# Patient Record
Sex: FEMALE | Race: BLACK OR AFRICAN AMERICAN | NOT HISPANIC OR LATINO | Employment: FULL TIME | URBAN - METROPOLITAN AREA
[De-identification: names, ages, dates, MRNs, and addresses within clinical notes are randomized per-mention and may not be internally consistent; named-entity substitution may affect disease eponyms.]

---

## 2022-05-19 ENCOUNTER — TELEPHONE (OUTPATIENT)
Dept: OBSTETRICS AND GYNECOLOGY | Facility: CLINIC | Age: 37
End: 2022-05-19
Payer: MEDICAID

## 2022-05-19 NOTE — TELEPHONE ENCOUNTER
----- Message from Lisa Murrieta sent at 5/19/2022 12:35 PM CDT -----  Regarding: appt request  Contact: pt  Pt requesting an appt for excessive bleeding. [phone]

## 2023-10-30 ENCOUNTER — HOSPITAL ENCOUNTER (EMERGENCY)
Facility: HOSPITAL | Age: 38
Discharge: HOME | End: 2023-10-30
Attending: EMERGENCY MEDICINE
Payer: MEDICAID

## 2023-10-30 VITALS
HEART RATE: 101 BPM | TEMPERATURE: 98 F | SYSTOLIC BLOOD PRESSURE: 148 MMHG | WEIGHT: 221.81 LBS | HEIGHT: 65 IN | BODY MASS INDEX: 36.96 KG/M2 | RESPIRATION RATE: 16 BRPM | DIASTOLIC BLOOD PRESSURE: 95 MMHG | OXYGEN SATURATION: 98 %

## 2023-10-30 DIAGNOSIS — R51.9 FACIAL PAIN: Primary | ICD-10-CM
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
## 2023-10-30 DIAGNOSIS — S00.83XA CONTUSION OF FACE, INITIAL ENCOUNTER: ICD-10-CM

## 2023-10-30 PROCEDURE — 99284 EMERGENCY DEPT VISIT MOD MDM: CPT | Mod: 25

## 2023-10-30 RX ORDER — DICLOFENAC SODIUM 50 MG/1
50 TABLET, DELAYED RELEASE ORAL 3 TIMES DAILY PRN
Qty: 15 TABLET | Refills: 0 | Status: SHIPPED | OUTPATIENT
Start: 2023-10-30 | End: 2024-02-27
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
## 2025-06-02 PROCEDURE — 96376 TX/PRO/DX INJ SAME DRUG ADON: CPT

## 2025-06-02 PROCEDURE — 96365 THER/PROPH/DIAG IV INF INIT: CPT

## 2025-06-02 PROCEDURE — 96375 TX/PRO/DX INJ NEW DRUG ADDON: CPT

## 2025-06-02 PROCEDURE — 99285 EMERGENCY DEPT VISIT HI MDM: CPT

## 2025-06-03 ENCOUNTER — HOSPITAL ENCOUNTER (INPATIENT)
Facility: HOSPITAL | Age: 40
LOS: 3 days | Discharge: HOME | DRG: 419 | End: 2025-06-06
Attending: EMERGENCY MEDICINE | Admitting: FAMILY MEDICINE
Payer: MEDICAID

## 2025-06-03 DIAGNOSIS — F17.200 TOBACCO DEPENDENCY: ICD-10-CM

## 2025-06-03 DIAGNOSIS — R07.9 CHEST PAIN: ICD-10-CM

## 2025-06-03 DIAGNOSIS — K81.9 CHOLECYSTITIS: Primary | ICD-10-CM

## 2025-06-03 DIAGNOSIS — K52.9 COLITIS: ICD-10-CM

## 2025-06-03 PROBLEM — I72.2 RENAL ARTERY ANEURYSM: Status: ACTIVE | Noted: 2025-06-03

## 2025-06-03 PROBLEM — R10.11 RIGHT UPPER QUADRANT ABDOMINAL PAIN: Status: ACTIVE | Noted: 2025-06-03

## 2025-06-03 PROBLEM — K80.20 CHOLELITHIASIS: Status: ACTIVE | Noted: 2025-06-03

## 2025-06-03 PROBLEM — R10.10 PAIN OF UPPER ABDOMEN: Status: ACTIVE | Noted: 2025-06-03

## 2025-06-03 PROBLEM — I10 ESSENTIAL HYPERTENSION: Status: ACTIVE | Noted: 2025-06-03

## 2025-06-03 LAB
ABSOLUTE EOSINOPHIL (OHS): 0.47 K/UL
ABSOLUTE MONOCYTE (OHS): 0.94 K/UL (ref 0.3–1)
ABSOLUTE NEUTROPHIL COUNT (OHS): 10.78 K/UL (ref 1.8–7.7)
ALBUMIN SERPL BCP-MCNC: 3.6 G/DL (ref 3.5–5.2)
ALP SERPL-CCNC: 97 UNIT/L (ref 40–150)
ALT SERPL W/O P-5'-P-CCNC: 23 UNIT/L (ref 10–44)
ANION GAP (OHS): 14 MMOL/L (ref 8–16)
AST SERPL-CCNC: 26 UNIT/L (ref 11–45)
BACTERIA #/AREA URNS AUTO: NORMAL /HPF
BASOPHILS # BLD AUTO: 0.04 K/UL
BASOPHILS NFR BLD AUTO: 0.3 %
BILIRUB SERPL-MCNC: 0.4 MG/DL (ref 0.1–1)
BILIRUB UR QL STRIP.AUTO: NEGATIVE
BUN SERPL-MCNC: 8 MG/DL (ref 6–20)
CALCIUM SERPL-MCNC: 8.8 MG/DL (ref 8.7–10.5)
CHLORIDE SERPL-SCNC: 104 MMOL/L (ref 95–110)
CLARITY UR: CLEAR
CO2 SERPL-SCNC: 17 MMOL/L (ref 23–29)
COLOR UR AUTO: YELLOW
CREAT SERPL-MCNC: 0.7 MG/DL (ref 0.5–1.4)
ERYTHROCYTE [DISTWIDTH] IN BLOOD BY AUTOMATED COUNT: 12.9 % (ref 11.5–14.5)
GFR SERPLBLD CREATININE-BSD FMLA CKD-EPI: >60 ML/MIN/1.73/M2
GLUCOSE SERPL-MCNC: 158 MG/DL (ref 70–110)
GLUCOSE UR QL STRIP: NEGATIVE
HCT VFR BLD AUTO: 38.1 % (ref 37–48.5)
HCV AB SERPL QL IA: NEGATIVE
HGB BLD-MCNC: 12.6 GM/DL (ref 12–16)
HGB UR QL STRIP: ABNORMAL
HIV 1+2 AB+HIV1 P24 AG SERPL QL IA: NEGATIVE
HOLD SPECIMEN: NORMAL
HOLD SPECIMEN: NORMAL
IMM GRANULOCYTES # BLD AUTO: 0.06 K/UL (ref 0–0.04)
IMM GRANULOCYTES NFR BLD AUTO: 0.4 % (ref 0–0.5)
INFLUENZA A MOLECULAR (OHS): NEGATIVE
INFLUENZA B MOLECULAR (OHS): NEGATIVE
KETONES UR QL STRIP: ABNORMAL
LEUKOCYTE ESTERASE UR QL STRIP: NEGATIVE
LIPASE SERPL-CCNC: 18 U/L (ref 4–60)
LYMPHOCYTES # BLD AUTO: 1.36 K/UL (ref 1–4.8)
MCH RBC QN AUTO: 30.1 PG (ref 27–31)
MCHC RBC AUTO-ENTMCNC: 33.1 G/DL (ref 32–36)
MCV RBC AUTO: 91 FL (ref 82–98)
MICROSCOPIC COMMENT: NORMAL
NITRITE UR QL STRIP: NEGATIVE
NUCLEATED RBC (/100WBC) (OHS): 0 /100 WBC
PH UR STRIP: 8 [PH]
PLATELET # BLD AUTO: 363 K/UL (ref 150–450)
PMV BLD AUTO: 9.7 FL (ref 9.2–12.9)
POTASSIUM SERPL-SCNC: 3.4 MMOL/L (ref 3.5–5.1)
PROT SERPL-MCNC: 8.2 GM/DL (ref 6–8.4)
PROT UR QL STRIP: NEGATIVE
RBC # BLD AUTO: 4.18 M/UL (ref 4–5.4)
RBC #/AREA URNS AUTO: 3 /HPF (ref 0–4)
RELATIVE EOSINOPHIL (OHS): 3.4 %
RELATIVE LYMPHOCYTE (OHS): 10 % (ref 18–48)
RELATIVE MONOCYTE (OHS): 6.9 % (ref 4–15)
RELATIVE NEUTROPHIL (OHS): 79 % (ref 38–73)
SARS-COV-2 RDRP RESP QL NAA+PROBE: NEGATIVE
SODIUM SERPL-SCNC: 135 MMOL/L (ref 136–145)
SP GR UR STRIP: >=1.03
SQUAMOUS #/AREA URNS AUTO: 1 /HPF
UROBILINOGEN UR STRIP-ACNC: NEGATIVE EU/DL
WBC # BLD AUTO: 13.65 K/UL (ref 3.9–12.7)
WBC #/AREA URNS AUTO: 1 /HPF (ref 0–5)

## 2025-06-03 PROCEDURE — 25000003 PHARM REV CODE 250: Performed by: NURSE PRACTITIONER

## 2025-06-03 PROCEDURE — G0378 HOSPITAL OBSERVATION PER HR: HCPCS

## 2025-06-03 PROCEDURE — 63600175 PHARM REV CODE 636 W HCPCS: Performed by: NURSE PRACTITIONER

## 2025-06-03 PROCEDURE — 80053 COMPREHEN METABOLIC PANEL: CPT | Performed by: NURSE PRACTITIONER

## 2025-06-03 PROCEDURE — U0002 COVID-19 LAB TEST NON-CDC: HCPCS | Performed by: NURSE PRACTITIONER

## 2025-06-03 PROCEDURE — 99900035 HC TECH TIME PER 15 MIN (STAT)

## 2025-06-03 PROCEDURE — 25000003 PHARM REV CODE 250: Performed by: EMERGENCY MEDICINE

## 2025-06-03 PROCEDURE — 83690 ASSAY OF LIPASE: CPT | Performed by: NURSE PRACTITIONER

## 2025-06-03 PROCEDURE — 87502 INFLUENZA DNA AMP PROBE: CPT | Performed by: NURSE PRACTITIONER

## 2025-06-03 PROCEDURE — 63600175 PHARM REV CODE 636 W HCPCS: Performed by: EMERGENCY MEDICINE

## 2025-06-03 PROCEDURE — 11000001 HC ACUTE MED/SURG PRIVATE ROOM

## 2025-06-03 PROCEDURE — 99204 OFFICE O/P NEW MOD 45 MIN: CPT | Mod: ,,, | Performed by: SURGERY

## 2025-06-03 PROCEDURE — 63600175 PHARM REV CODE 636 W HCPCS: Performed by: COLON & RECTAL SURGERY

## 2025-06-03 PROCEDURE — 86803 HEPATITIS C AB TEST: CPT | Performed by: EMERGENCY MEDICINE

## 2025-06-03 PROCEDURE — 25000003 PHARM REV CODE 250: Performed by: COLON & RECTAL SURGERY

## 2025-06-03 PROCEDURE — 25500020 PHARM REV CODE 255: Performed by: EMERGENCY MEDICINE

## 2025-06-03 PROCEDURE — 63600175 PHARM REV CODE 636 W HCPCS: Performed by: STUDENT IN AN ORGANIZED HEALTH CARE EDUCATION/TRAINING PROGRAM

## 2025-06-03 PROCEDURE — 85025 COMPLETE CBC W/AUTO DIFF WBC: CPT | Performed by: NURSE PRACTITIONER

## 2025-06-03 PROCEDURE — 99204 OFFICE O/P NEW MOD 45 MIN: CPT | Mod: ,,, | Performed by: STUDENT IN AN ORGANIZED HEALTH CARE EDUCATION/TRAINING PROGRAM

## 2025-06-03 PROCEDURE — 81001 URINALYSIS AUTO W/SCOPE: CPT | Performed by: NURSE PRACTITIONER

## 2025-06-03 PROCEDURE — 94761 N-INVAS EAR/PLS OXIMETRY MLT: CPT

## 2025-06-03 PROCEDURE — 87389 HIV-1 AG W/HIV-1&-2 AB AG IA: CPT | Performed by: EMERGENCY MEDICINE

## 2025-06-03 PROCEDURE — 94799 UNLISTED PULMONARY SVC/PX: CPT

## 2025-06-03 RX ORDER — SODIUM CHLORIDE 9 MG/ML
INJECTION, SOLUTION INTRAVENOUS CONTINUOUS
Status: DISCONTINUED | OUTPATIENT
Start: 2025-06-03 | End: 2025-06-06

## 2025-06-03 RX ORDER — MORPHINE SULFATE 4 MG/ML
4 INJECTION, SOLUTION INTRAMUSCULAR; INTRAVENOUS
Refills: 0 | Status: COMPLETED | OUTPATIENT
Start: 2025-06-03 | End: 2025-06-03

## 2025-06-03 RX ORDER — HYDROMORPHONE HYDROCHLORIDE 1 MG/ML
0.5 INJECTION, SOLUTION INTRAMUSCULAR; INTRAVENOUS; SUBCUTANEOUS EVERY 4 HOURS PRN
Refills: 0 | Status: DISCONTINUED | OUTPATIENT
Start: 2025-06-03 | End: 2025-06-05

## 2025-06-03 RX ORDER — HYDRALAZINE HYDROCHLORIDE 20 MG/ML
10 INJECTION INTRAMUSCULAR; INTRAVENOUS EVERY 6 HOURS PRN
Status: DISCONTINUED | OUTPATIENT
Start: 2025-06-03 | End: 2025-06-06 | Stop reason: HOSPADM

## 2025-06-03 RX ORDER — ONDANSETRON HYDROCHLORIDE 2 MG/ML
4 INJECTION, SOLUTION INTRAVENOUS
Status: COMPLETED | OUTPATIENT
Start: 2025-06-03 | End: 2025-06-03

## 2025-06-03 RX ORDER — IBUPROFEN 200 MG
24 TABLET ORAL
Status: DISCONTINUED | OUTPATIENT
Start: 2025-06-03 | End: 2025-06-06 | Stop reason: HOSPADM

## 2025-06-03 RX ORDER — HYDROMORPHONE HYDROCHLORIDE 1 MG/ML
1 INJECTION, SOLUTION INTRAMUSCULAR; INTRAVENOUS; SUBCUTANEOUS ONCE
Status: DISCONTINUED | OUTPATIENT
Start: 2025-06-03 | End: 2025-06-03

## 2025-06-03 RX ORDER — SODIUM CHLORIDE 0.9 % (FLUSH) 0.9 %
3 SYRINGE (ML) INJECTION EVERY 8 HOURS PRN
Status: DISCONTINUED | OUTPATIENT
Start: 2025-06-03 | End: 2025-06-06 | Stop reason: HOSPADM

## 2025-06-03 RX ORDER — ACETAMINOPHEN 325 MG/1
650 TABLET ORAL EVERY 4 HOURS PRN
Status: DISCONTINUED | OUTPATIENT
Start: 2025-06-03 | End: 2025-06-06 | Stop reason: HOSPADM

## 2025-06-03 RX ORDER — IBUPROFEN 200 MG
16 TABLET ORAL
Status: DISCONTINUED | OUTPATIENT
Start: 2025-06-03 | End: 2025-06-06 | Stop reason: HOSPADM

## 2025-06-03 RX ORDER — HYDROMORPHONE HYDROCHLORIDE 1 MG/ML
1 INJECTION, SOLUTION INTRAMUSCULAR; INTRAVENOUS; SUBCUTANEOUS
Refills: 0 | Status: COMPLETED | OUTPATIENT
Start: 2025-06-03 | End: 2025-06-03

## 2025-06-03 RX ORDER — NALOXONE HCL 0.4 MG/ML
0.02 VIAL (ML) INJECTION
Status: DISCONTINUED | OUTPATIENT
Start: 2025-06-03 | End: 2025-06-06 | Stop reason: HOSPADM

## 2025-06-03 RX ORDER — ONDANSETRON HYDROCHLORIDE 2 MG/ML
4 INJECTION, SOLUTION INTRAVENOUS EVERY 6 HOURS PRN
Status: DISCONTINUED | OUTPATIENT
Start: 2025-06-03 | End: 2025-06-06 | Stop reason: HOSPADM

## 2025-06-03 RX ORDER — TALC
6 POWDER (GRAM) TOPICAL NIGHTLY PRN
Status: DISCONTINUED | OUTPATIENT
Start: 2025-06-03 | End: 2025-06-06 | Stop reason: HOSPADM

## 2025-06-03 RX ORDER — GLUCAGON 1 MG
1 KIT INJECTION
Status: DISCONTINUED | OUTPATIENT
Start: 2025-06-03 | End: 2025-06-06 | Stop reason: HOSPADM

## 2025-06-03 RX ORDER — BISACODYL 10 MG/1
10 SUPPOSITORY RECTAL DAILY PRN
Status: DISCONTINUED | OUTPATIENT
Start: 2025-06-03 | End: 2025-06-06 | Stop reason: HOSPADM

## 2025-06-03 RX ADMIN — ONDANSETRON 4 MG: 2 INJECTION INTRAMUSCULAR; INTRAVENOUS at 01:06

## 2025-06-03 RX ADMIN — MORPHINE SULFATE 4 MG: 4 INJECTION INTRAVENOUS at 02:06

## 2025-06-03 RX ADMIN — SODIUM CHLORIDE: 9 INJECTION, SOLUTION INTRAVENOUS at 05:06

## 2025-06-03 RX ADMIN — IOHEXOL 100 ML: 350 INJECTION, SOLUTION INTRAVENOUS at 02:06

## 2025-06-03 RX ADMIN — SODIUM CHLORIDE: 9 INJECTION, SOLUTION INTRAVENOUS at 08:06

## 2025-06-03 RX ADMIN — MORPHINE SULFATE 4 MG: 4 INJECTION INTRAVENOUS at 01:06

## 2025-06-03 RX ADMIN — PIPERACILLIN AND TAZOBACTAM 4.5 G: 4; .5 INJECTION, POWDER, LYOPHILIZED, FOR SOLUTION INTRAVENOUS; PARENTERAL at 08:06

## 2025-06-03 RX ADMIN — ACETAMINOPHEN 650 MG: 325 TABLET ORAL at 03:06

## 2025-06-03 RX ADMIN — PIPERACILLIN AND TAZOBACTAM 4.5 G: 4; .5 INJECTION, POWDER, LYOPHILIZED, FOR SOLUTION INTRAVENOUS; PARENTERAL at 02:06

## 2025-06-03 RX ADMIN — ONDANSETRON 4 MG: 2 INJECTION INTRAMUSCULAR; INTRAVENOUS at 09:06

## 2025-06-03 RX ADMIN — PROMETHAZINE HYDROCHLORIDE 12.5 MG: 25 INJECTION INTRAMUSCULAR; INTRAVENOUS at 02:06

## 2025-06-03 RX ADMIN — HYDROMORPHONE HYDROCHLORIDE 0.5 MG: 1 INJECTION, SOLUTION INTRAMUSCULAR; INTRAVENOUS; SUBCUTANEOUS at 11:06

## 2025-06-03 RX ADMIN — HYDROMORPHONE HYDROCHLORIDE 1 MG: 1 INJECTION, SOLUTION INTRAMUSCULAR; INTRAVENOUS; SUBCUTANEOUS at 04:06

## 2025-06-03 RX ADMIN — PIPERACILLIN AND TAZOBACTAM 4.5 G: 4; .5 INJECTION, POWDER, LYOPHILIZED, FOR SOLUTION INTRAVENOUS at 04:06

## 2025-06-03 RX ADMIN — HYDROMORPHONE HYDROCHLORIDE 0.5 MG: 1 INJECTION, SOLUTION INTRAMUSCULAR; INTRAVENOUS; SUBCUTANEOUS at 09:06

## 2025-06-03 NOTE — PLAN OF CARE
Discussed poc with pt, pt verbalized understanding    Purposeful rounding every 2hours    VS wnl   Fall precautions in place, remains injury free  Pt denies c/o any nausea or vomiting at this time and patient will continue to be monitored.  Pain and nausea under control with PRN meds    IVFs  Accurate I&Os  Abx given as prescribed  Bed locked at lowest position  Call light within reach    Chart check complete  Will cont with POC

## 2025-06-03 NOTE — ASSESSMENT & PLAN NOTE
Patient's blood pressure range in the last 24 hours was: BP  Min: 136/78  Max: 195/95.The patient's inpatient anti-hypertensive regimen is listed below:  Current Antihypertensives  hydrALAZINE injection 10 mg, Every 6 hours PRN, Intravenous    Plan  - BP is controlled, no changes needed to their regimen  - Pain control helped with BP, holding home HCTZ for now

## 2025-06-03 NOTE — SUBJECTIVE & OBJECTIVE
Past Medical History:   Diagnosis Date    Abnormal Pap smear of cervix     Essential (primary) hypertension     Pulmonary embolism        Past Surgical History:   Procedure Laterality Date     SECTION      DIAGNOSTIC LAPAROSCOPY  2023    DILATION AND CURETTAGE OF UTERUS         Review of patient's allergies indicates:  No Known Allergies    No current facility-administered medications on file prior to encounter.     Current Outpatient Medications on File Prior to Encounter   Medication Sig    hydroCHLOROthiazide (MICROZIDE) 12.5 mg capsule hydrochlorothiazide Take No date recorded No form recorded No frequency recorded No route recorded No set duration recorded No set duration amount recorded active No dosage strength recorded No dosage strength units of measure recorded    ibuprofen (ADVIL,MOTRIN) 600 MG tablet Take 1 tablet (600 mg total) by mouth 3 (three) times daily.    oxyCODONE-acetaminophen (PERCOCET) 5-325 mg per tablet Take 1 tablet by mouth every 4 (four) hours as needed for Pain. for pain.     Family History    None       Tobacco Use    Smoking status: Some Days     Types: Cigarettes    Smokeless tobacco: Never   Substance and Sexual Activity    Alcohol use: Not on file    Drug use: Not on file    Sexual activity: Not on file     Review of Systems   Constitutional: Negative.  Negative for chills and fever.   HENT: Negative.     Eyes: Negative.    Respiratory:  Positive for chest tightness. Negative for cough, shortness of breath and wheezing.    Cardiovascular: Negative.  Negative for chest pain, palpitations and leg swelling.   Gastrointestinal:  Positive for abdominal pain, constipation, nausea and vomiting.   Endocrine: Negative.    Genitourinary: Negative.    Musculoskeletal: Negative.    Skin: Negative.    Allergic/Immunologic: Negative.    Neurological: Negative.    Psychiatric/Behavioral: Negative.     All other systems reviewed and are negative.    Objective:     Vital Signs  (Most Recent):  Temp: 98.5 °F (36.9 °C) (06/02/25 2127)  Pulse: 66 (06/03/25 0557)  Resp: 18 (06/03/25 0557)  BP: (!) 195/95 (06/03/25 0557)  SpO2: 100 % (06/03/25 0557) Vital Signs (24h Range):  Temp:  [98.5 °F (36.9 °C)] 98.5 °F (36.9 °C)  Pulse:  [66-88] 66  Resp:  [16-19] 18  SpO2:  [98 %-100 %] 100 %  BP: (136-195)/(73-95) 195/95     Weight: 104.3 kg (230 lb)  Body mass index is 38.27 kg/m².     Physical Exam  Vitals reviewed.   Constitutional:       General: She is not in acute distress.     Appearance: She is ill-appearing.   HENT:      Head: Normocephalic and atraumatic.      Nose: Nose normal.      Mouth/Throat:      Mouth: Mucous membranes are moist.      Pharynx: Oropharynx is clear.   Eyes:      Extraocular Movements: Extraocular movements intact.      Pupils: Pupils are equal, round, and reactive to light.   Cardiovascular:      Rate and Rhythm: Normal rate and regular rhythm.      Pulses: Normal pulses.      Heart sounds: Normal heart sounds.   Pulmonary:      Effort: Pulmonary effort is normal. No respiratory distress.      Breath sounds: Normal breath sounds. No wheezing or rales.   Chest:      Chest wall: No tenderness.   Abdominal:      General: Bowel sounds are normal. There is no distension.      Palpations: Abdomen is soft.      Tenderness: There is abdominal tenderness. There is guarding.      Comments: Generalized abdominal tenderness with palpation, especially to epigastric area and RUQ, mild TTP to LLQ   Musculoskeletal:         General: Normal range of motion.      Cervical back: Neck supple.      Right lower leg: No edema.      Left lower leg: No edema.   Skin:     General: Skin is warm and dry.      Capillary Refill: Capillary refill takes less than 2 seconds.   Neurological:      General: No focal deficit present.      Mental Status: She is alert and oriented to person, place, and time.   Psychiatric:         Mood and Affect: Mood normal.         Behavior: Behavior normal.          Thought Content: Thought content normal.              CRANIAL NERVES     CN III, IV, VI   Pupils are equal, round, and reactive to light.       Significant Labs: All pertinent labs within the past 24 hours have been reviewed.  Recent Lab Results         06/03/25  0503   06/03/25  0137   06/03/25  0039        Influenza A, Molecular     Negative       Influenza B, Molecular     Negative       Albumin   3.6         ALP   97         ALT   23         Anion Gap   14         Appearance, UA Clear           AST   26         Bacteria, UA Rare           Baso #   0.04         Basophil %   0.3         Bilirubin (UA) Negative           BILIRUBIN TOTAL   0.4  Comment: For infants and newborns, interpretation of results should be based   on gestational age, weight and in agreement with clinical   observations.    Premature Infant recommended reference ranges:   0-24 hours:  <8.0 mg/dL   24-48 hours: <12.0 mg/dL   3-5 days:    <15.0 mg/dL   6-29 days:   <15.0 mg/dL         BUN   8         Calcium   8.8         Chloride   104         CO2   17         Color, UA Yellow           SARS COV-2 MOLECULAR     Negative  Comment: This test utilizes isothermal nucleic acid amplification technology to detect the SARS-CoV-2 RdRp nucleic acid segment. The analytical sensitivity (limit of detection) is 500 copies/swab.     A POSITIVE result is indicative of the presence of SARS-CoV-2 RNA; clinical correlation with patient history and other diagnostic information is necessary to determine patient infection status.    A NEGATIVE result means that SARS-CoV-2 nucleic acids are not present above the limit of detection. A NEGATIVE result should be treated as presumptive. It does not rule out the possibility of COVID-19 and should not be the sole basis for treatment decisions.    This test is Food and Drug Administration (FDA) approved.  Performance characteristics of this test has been independently verified by [facility] Department of  Pathology and Laboratory Medicine.       Creatinine   0.7         eGFR   >60  Comment: Estimated GFR calculated using the CKD-EPI creatinine (2021) equation.         Eos #   0.47         Eos %   3.4         Glucose   158         Glucose, UA Negative           Gran # (ANC)   10.78         Hematocrit   38.1         Hemoglobin   12.6         Hepatitis C Ab   Negative         HIV 1/2 Ag/Ab   Negative         Extra Tube Hold for add-ons.  Comment: Auto resulted.      Hold for add-ons.  Comment: Auto resulted.            Immature Grans (Abs)   0.06  Comment: Mild elevation in immature granulocytes is non specific and can be seen in a variety of conditions including stress response, acute inflammation, trauma and pregnancy. Correlation with other laboratory and clinical findings is essential.         Immature Granulocytes   0.4         Ketones, UA 2+           Leukocyte Esterase, UA Negative           Lipase   18         Lymph #   1.36         Lymph %   10.0         MCH   30.1         MCHC   33.1         MCV   91         Microscopic Comment   Comment: Other formed elements not mentioned in the report are not present in the microscopic examination.           Mono #   0.94         Mono %   6.9         MPV   9.7         Neut %   79.0         NITRITE UA Negative           nRBC   0         Blood, UA 1+           pH, UA 8.0           Platelet Count   363         Potassium   3.4         PROTEIN TOTAL   8.2         Protein, UA Negative  Comment: Recommend a 24 hour urine protein or a urine protein/creatinine ratio if globulin induced proteinuria is clinically suspected.           RBC   4.18         RBC, UA 3           RDW   12.9         Sodium   135         Spec Grav UA >=1.030           Squam Epithel, UA 1           Urobilinogen, UA Negative           WBC, UA 1           WBC   13.65                 Significant Imaging: I have reviewed all pertinent imaging results/findings within the past 24 hours.    [facility] Radiology Procedure  Done:  CT Abdomen and Pelvis With Intravenous Contrast  Interpretation:  Mild thickening of the walls of the left colon and sigmoid. Incomplete distention versus mild colitis. Cholelithiasis in a distended gallbladder. Common bile duct. Non obstructing renal stones. Thickened urinary bladder. Check UTI/cystitis. 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  Radiologist:  Daniel Ochoa MD  6/3/2025  02:38

## 2025-06-03 NOTE — HPI
Rhona Zaman is a 39 y.o. female as hypertension, and history of pulmonary embolism in 2020 (not on anticoagulation), and current smoking who presented to the ER last night with generalized abdominal pain as well as nausea, vomiting, constipation, and chest tightness.  She notes the pain is predominantly located in the left lower quadrant and up the left side of the abdomen, and states that she has been having this pain off and on for more than a year.  She notes that she had a hysterectomy about a year ago in attempts to deal with this pain however states the pain has only gotten worse.  In addition to her left lower quadrant pain which has improved with lying in the fetal position, she also does note some intermittent right upper quadrant and epigastric pain.  In addition she complains of nausea but without vomiting and decreased p.o. intake.  She is chronically constipated but did have some diarrhea last week.  She has never had a colonoscopy or an EGD.  In the ER she was afebrile, vitals were all within normal limits.  White count was mildly elevated at 13.65, the remainder of her labs were overall normal, including lipase and LFTs.  CT abdomen and pelvis was done with IV contrast which on initial report showed mild thickening of left colon and sigmoid concerning for colitis as well as stones and a distended gallbladder.  Subsequent right upper quadrant ultrasound today showed mild thickening of the wall of the gallbladder (up to 4mm) with several stones, but no pericholecystic fluid or sonographic Santiago sign.

## 2025-06-03 NOTE — ASSESSMENT & PLAN NOTE
Discussed with her that upper abdominal pain has a wide differential.  On imaging her colitis extensor in the left colon all the way to the looks like it is involving at least part of the transverse.  In addition she could have cholecystitis (although this seems little less likely), or could even have an ulcer.    -- recommend HIDA Scan   -- recommend GI eval for colitis or underlying IBD

## 2025-06-03 NOTE — ASSESSMENT & PLAN NOTE
Relatively confusing picture especially since the bulk of her pain that she has is located in the left lower quadrant which is not consistent with cholecystitis.  Is certainly possible that the nausea and the right upper quadrant pain and even some of the epigastric pain may be due to her gallbladder.  Due to this confusing picture I would recommend obtaining a HIDA scan.  I discussed with the patient and her mother extensively that if her HIDA scan is normal I would not recommend removing her gallbladder as I do not feel that that would improve her pain or her symptoms at this time.  If however the HIDA scan fails to show filling her gallbladder I certainly think it is reasonable to remove her gallbladder but with the understanding that it likely will not resolve her left lower quadrant pain.

## 2025-06-03 NOTE — ASSESSMENT & PLAN NOTE
The management as per primary team and vascular   I have personally evaluated and examined the patient. The Attending was available to me as a supervising provider if needed.

## 2025-06-03 NOTE — HPI
Patient is a 39-year-old female with past medical history significant for hypertension, pulmonary embolism in 2020, and tobacco abuse who presented  to ED with complaint of generalized abdominal pain. She also reports nausea, vomiting, constipation, and chest tightness. She denies vomiting, dysuria, fever, chills, back pain, shortness of breath, productive cough, weakness, edema. on arrival to ED, temp 98.5°, heart rate 88, respirations 17, blood pressure 140/82, 98% SpO2 on room air. lab workup shows WBC 13.65, hemoglobin 12.6, hematocrit 38.1, platelets 363, sodium 135, potassium 3.4, chloride 104, CO2 17, BUN 8, creatinine 0.7, glucose 158, alk-phos 97, AST 26, ALT 23, lipase 18, negative covid 19 screening, negative influenza A/B screening, rapid HIV negative, UA  done with no signs of infection.  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 While in ED, she was given IV Zosyn, dilaudid, morphine, zofran, and phenergan. General surgery was consulted per ED. Hospital Medicine was consulted for admission due to abdominal pain. General surgery recommended US gallbladder, which is ordered, pending.

## 2025-06-03 NOTE — ASSESSMENT & PLAN NOTE
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

## 2025-06-03 NOTE — CONSULTS
"    Consult    Consulting Physician:  Chrissie Lopez MD  Reason for Consultation: right renal artery aneurysm    CC: Abdominal Pain and Generalized Body Aches (Pt c/o generalized abdominal pain, body aches, right side chest tightness for the past week with increase pain today. +N/V )      HPI: Rhona Zaman is a 39 y.o. female with PMHx as seen below, was admitted on 6/3/2025     Presents with abdominal pain. Hx of HTN, PE, smoking. She has nausea and vomiting. Ct abd pelvis shows thickening of left colon wall, colitis, distended gallbladder with stone in neck and 14mm right renal artery aneurysm. She is asymptomatic from aneurysm. She continues to smoke. Eval by general surgery is pending.      Past Medical History:   Diagnosis Date    Abnormal Pap smear of cervix     Essential (primary) hypertension     Pulmonary embolism        Past Surgical History:   Procedure Laterality Date     SECTION      DIAGNOSTIC LAPAROSCOPY  2023    DILATION AND CURETTAGE OF UTERUS         Social History[1]    No family history on file.    Current Medications[2]    Review of patient's allergies indicates:  No Known Allergies    ROS:  10 point review of systems is negative except as mentioned in HPI.    Vitals:    25 0911   BP:    Pulse:    Resp: 18   Temp:        Objective:  Vital signs: (most recent): Blood pressure (!) 149/71, pulse 71, temperature 98.5 °F (36.9 °C), temperature source Oral, resp. rate 18, height 5' 5" (1.651 m), weight 104.3 kg (230 lb), last menstrual period 2024, SpO2 100%, not currently breastfeeding.    Abd tender left side with voluntary guarding  Right upper quadrant minimal tenderness  She is obese  In mild distress from abdominal pain  No  right flank tenderness  Cv rrr  Lung ctab    LABS:      IMAGING:  I have personally reviewed the imaging.    CT Abdomen Pelvis With IV Contrast NO Oral Contrast   Final Result   Abnormal      Borderline findings for colitis.    "   Cholelithiasis in a distended gallbladder.  Further evaluation as needed.  No wall thickening or local fluid to specifically suggest acute cholecystitis but this is difficult to exclude.      Borderline findings for cystitis.  Correlation with urinalysis.      Complete findings as above.      This report was flagged in Epic as abnormal.  The preliminary and final reports are concordant.      All CT scans at this facility are performed  using dose modulation techniques as appropriate to performed exam including the following:  automated exposure control; adjustment of mA and/or kV according to the patients size (this includes techniques or standardized protocols for targeted exams where dose is matched to indication/reason for exam: i.e. extremities or head);  iterative reconstruction technique.         Electronically signed by: Leoncio Cardenas   Date:    06/03/2025   Time:    07:49      US Abdomen Limited    (Results Pending)       MDM      Assessment & Plan  Rhona Zaman is a 39 y.o. female with colitis  Right renal artery aneurysm  No urgent intervention is indicated  I will set her up to see me in 3 months she is a woman of child bearing age this will warrant intervention at some point but want her to get better from current clinical picture with colitis. No restrictions from my standpoint      Elisabeth Collins  6/3/2025  [facility]  Vascular Surgery  [phone] (Clinic Number)    Active Hospital Problems    Diagnosis  POA    *Pain of upper abdomen [R10.10]  Yes    Cholelithiasis [K80.20]  Yes    Colitis [K52.9]  Yes    Essential hypertension [I10]  Yes    Renal artery aneurysm [I72.2]  Yes      Resolved Hospital Problems   No resolved problems to display.            [1]   Social History  Socioeconomic History    Marital status: Single   Tobacco Use    Smoking status: Some Days     Types: Cigarettes    Smokeless tobacco: Never     Social Drivers of Health     Food Insecurity: No Food Insecurity  (1/31/2024)    Received from [facility]    Hunger Vital Sign     Worried About Running Out of Food in the Last Year: Never true     Ran Out of Food in the Last Year: Never true   Transportation Needs: No Transportation Needs (1/31/2024)    Received from [facility]    PRAPARE - Transportation     Lack of Transportation (Medical): No     Lack of Transportation (Non-Medical): No   Housing Stability: Unknown (1/31/2024)    Received from [facility]    Housing Stability Vital Sign     Unable to Pay for Housing in the Last Year: No     Homeless in the Last Year: No   [2]   Current Facility-Administered Medications:     0.9% NaCl infusion, , Intravenous, Continuous, Eli Sylvester, NP, Last Rate: 150 mL/hr at 06/03/25 0855, New Bag at 06/03/25 0855    acetaminophen tablet 650 mg, 650 mg, Oral, Q4H PRN, Eli Sylvester, NP    bisacodyL suppository 10 mg, 10 mg, Rectal, Daily PRN, Eli Sylvester NP    dextrose 50% injection 12.5 g, 12.5 g, Intravenous, PRN, Eli Sylvester, NP    dextrose 50% injection 25 g, 25 g, Intravenous, PRN, Eli Sylvester, NP    glucagon (human recombinant) injection 1 mg, 1 mg, Intramuscular, PRN, Eli Sylvester NP    glucose chewable tablet 16 g, 16 g, Oral, PRN, Eli Sylvester, NP    glucose chewable tablet 24 g, 24 g, Oral, PRN, Eli Sylvester, NP    hydrALAZINE injection 10 mg, 10 mg, Intravenous, Q6H PRN, Eli Sylvester, NP    HYDROmorphone injection 0.5 mg, 0.5 mg, Intravenous, Q4H PRN, Chrissie Lopez MD, 0.5 mg at 06/03/25 0911    melatonin tablet 6 mg, 6 mg, Oral, Nightly PRN, Eli Sylvester NP    naloxone 0.4 mg/mL injection 0.02 mg, 0.02 mg, Intravenous, PRN, Eli Sylvester, NP    ondansetron injection 4 mg, 4 mg, Intravenous, Q6H PRN, Eli Sylvester, NP, 4 mg at 06/03/25 0910    piperacillin-tazobactam (ZOSYN) 4.5 g in D5W 100 mL IVPB (MB+), 4.5 g, Intravenous, Q8H, Eli Sylvester NP    sodium chloride 0.9% flush 3 mL, 3 mL, Intravenous, Q8H PRN, Higinio,  ROGELIO Benitez    Current Outpatient Medications:     hydroCHLOROthiazide (MICROZIDE) 12.5 mg capsule, hydrochlorothiazide Take No date recorded No form recorded No frequency recorded No route recorded No set duration recorded No set duration amount recorded active No dosage strength recorded No dosage strength units of measure recorded, Disp: , Rfl:     ibuprofen (ADVIL,MOTRIN) 600 MG tablet, Take 1 tablet (600 mg total) by mouth 3 (three) times daily., Disp: 30 tablet, Rfl: 1    oxyCODONE-acetaminophen (PERCOCET) 5-325 mg per tablet, Take 1 tablet by mouth every 4 (four) hours as needed for Pain. for pain., Disp: , Rfl:

## 2025-06-03 NOTE — FIRST PROVIDER EVALUATION
"Medical screening examination initiated.  I have conducted a focused provider triage encounter, findings are as follows:    Brief history of present illness:  Complaining of abdominal pain and body aches    Vitals:    06/02/25 2127   BP: (!) 140/82   Pulse: 88   Resp: 17   Temp: 98.5 °F (36.9 °C)   TempSrc: Oral   SpO2: 98%   Weight: 104.3 kg (230 lb)   Height: 5' 5" (1.651 m)       Pertinent physical exam:  No acute distress    Brief workup plan:  Labs, further evaluation    Preliminary workup initiated; this workup will be continued and followed by the physician or advanced practice provider that is assigned to the patient when roomed.  "

## 2025-06-03 NOTE — ED PROVIDER NOTES
"SCRIBE #1 NOTE: I, Yvonne Mccarthy, am scribing for, and in the presence of, Gaurang Pool MD. I have scribed the entire note.       History     Chief Complaint   Patient presents with    Abdominal Pain    Generalized Body Aches     Pt c/o generalized abdominal pain, body aches, right side chest tightness for the past week with increase pain today. +N/V      Review of patient's allergies indicates:  No Known Allergies      History of Present Illness     HPI    6/3/2025, 1:36 AM  History obtained from the patient and medical records      History of Present Illness: Rhona Zaman is a 39 y.o. female patient with a PMHx of essential HTN, and PE who presents to the Emergency Department for evaluation of generalized abdominal pain which began today. Pt states the pain "feels like contractions" but she had her uterus removed. States the pain will ease if she grabs the area very hard. Symptoms are constant and moderate in severity. No mitigating or exacerbating factors reported. Associated sxs include constipation, nausea, chest tightness, and abdominal pain. Patient denies any vomiting. No prior Tx specified.  No further complaints or concerns at this time.       Arrival mode: Personal Transportation    PCP: Jany, Primary Doctor        Past Medical History:  Past Medical History:   Diagnosis Date    Abnormal Pap smear of cervix     Essential (primary) hypertension     Pulmonary embolism        Past Surgical History:  Past Surgical History:   Procedure Laterality Date     SECTION      DIAGNOSTIC LAPAROSCOPY  2023    DILATION AND CURETTAGE OF UTERUS           Family History:  No family history on file.    Social History:  Social History     Tobacco Use    Smoking status: Some Days     Types: Cigarettes    Smokeless tobacco: Never   Substance and Sexual Activity    Alcohol use: Not on file    Drug use: Not on file    Sexual activity: Not on file        Review of Systems     Review of Systems "   Constitutional:  Negative for fever.   HENT:  Negative for sore throat.    Respiratory:  Positive for chest tightness. Negative for shortness of breath.    Cardiovascular:  Negative for chest pain.   Gastrointestinal:  Positive for abdominal pain, constipation and nausea. Negative for vomiting.   Genitourinary:  Negative for dysuria.   Musculoskeletal:  Negative for back pain.   Skin:  Negative for rash.   Neurological:  Negative for weakness.   Hematological:  Does not bruise/bleed easily.   All other systems reviewed and are negative.       Physical Exam     Initial Vitals [06/02/25 2127]   BP Pulse Resp Temp SpO2   (!) 140/82 88 17 98.5 °F (36.9 °C) 98 %      MAP       --          Physical Exam  Nursing Notes and Vital Signs Reviewed.  Constitutional: Patient is in mild distress. Well-developed and well-nourished.  Head: Atraumatic. Normocephalic.  Eyes: PERRL. EOM intact. Conjunctivae are not pale. No scleral icterus.  ENT: Mucous membranes are moist. Oropharynx is clear and symmetric.    Neck: Supple. Full ROM. No lymphadenopathy.  Cardiovascular: Regular rate. Regular rhythm. No murmurs, rubs, or gallops. Distal pulses are 2+ and symmetric.  Pulmonary/Chest: No respiratory distress. Clear to auscultation bilaterally. No wheezing or rales.  Abdominal: Soft and non-distended.  There is LLQ tenderness.  No rebound, guarding, or rigidity. Good bowel sounds.  Genitourinary: No CVA tenderness.  Musculoskeletal: Moves all extremities. No obvious deformities. No edema. No calf tenderness.  Skin: Warm and dry.  Neurological:  Alert, awake, and appropriate.  Normal speech.  No acute focal neurological deficits are appreciated.  Psychiatric: Normal affect. Good eye contact. Appropriate in content.     ED Course   Procedures  ED Vital Signs:  Vitals:    06/02/25 2127 06/03/25 0117 06/03/25 0155 06/03/25 0157   BP: (!) 140/82 (!) 169/80  136/78   Pulse: 88 72  73   Resp: 17 19 16 16   Temp: 98.5 °F (36.9 °C)     "  TempSrc: Oral      SpO2: 98% 100%  100%   Weight: 104.3 kg (230 lb)      Height: 5' 5" (1.651 m)       06/03/25 0241 06/03/25 0433 06/03/25 0457 06/03/25 0458   BP:  (!) 168/73 (!) 183/91    Pulse:  69 74    Resp: 16 18 18 16   Temp:       TempSrc:       SpO2:  100% 100%    Weight:       Height:        06/03/25 0557 06/03/25 0900 06/03/25 0911 06/03/25 1030   BP: (!) 195/95 (!) 149/71  138/79   Pulse: 66 71  68   Resp: 18 20 18 (!) 22   Temp:       TempSrc:       SpO2: 100% 100%  100%   Weight:       Height:        06/03/25 1230 06/03/25 1456 06/03/25 1600   BP: 137/64 (!) 164/81 (!) 164/81   Pulse: 75 87 87   Resp: (!) 26 18 18   Temp:  98.5 °F (36.9 °C) 98.5 °F (36.9 °C)   TempSrc:  Oral    SpO2: 100% 100% 100%   Weight:      Height:          Abnormal Lab Results:  Labs Reviewed   COMPREHENSIVE METABOLIC PANEL - Abnormal       Result Value    Sodium 135 (*)     Potassium 3.4 (*)     Chloride 104      CO2 17 (*)     Glucose 158 (*)     BUN 8      Creatinine 0.7      Calcium 8.8      Protein Total 8.2      Albumin 3.6      Bilirubin Total 0.4      ALP 97      AST 26      ALT 23      Anion Gap 14      eGFR >60     URINALYSIS, REFLEX TO URINE CULTURE - Abnormal    Color, UA Yellow      Appearance, UA Clear      pH, UA 8.0      Spec Grav UA >=1.030 (*)     Protein, UA Negative      Glucose, UA Negative      Ketones, UA 2+ (*)     Bilirubin, UA Negative      Blood, UA 1+ (*)     Nitrites, UA Negative      Urobilinogen, UA Negative      Leukocyte Esterase, UA Negative     CBC WITH DIFFERENTIAL - Abnormal    WBC 13.65 (*)     RBC 4.18      HGB 12.6      HCT 38.1      MCV 91      MCH 30.1      MCHC 33.1      RDW 12.9      Platelet Count 363      MPV 9.7      Nucleated RBC 0      Neut % 79.0 (*)     Lymph % 10.0 (*)     Mono % 6.9      Eos % 3.4      Basophil % 0.3      Imm Grans % 0.4      Neut # 10.78 (*)     Lymph # 1.36      Mono # 0.94      Eos # 0.47      Baso # 0.04      Imm Grans # 0.06 (*)     Narrative:     " Large platelets present    INFLUENZA A & B BY MOLECULAR - Normal    INFLUENZA A MOLECULAR Negative      INFLUENZA B MOLECULAR  Negative     HEPATITIS C ANTIBODY - Normal    Hep C Ab Interp Negative     HIV 1 / 2 ANTIBODY - Normal    HIV 1/2 Ag/Ab Negative     LIPASE - Normal    Lipase Level 18     SARS-COV-2 RNA AMPLIFICATION, QUAL - Normal    SARS COV-2 Molecular Negative     HEP C VIRUS HOLD SPECIMEN    Extra Tube Hold for add-ons.     CBC W/ AUTO DIFFERENTIAL    Narrative:     The following orders were created for panel order CBC W/ AUTO DIFFERENTIAL.  Procedure                               Abnormality         Status                     ---------                               -----------         ------                     CBC with Differential[[id number]]       Abnormal            Final result                 Please view results for these tests on the individual orders.   GREY TOP URINE HOLD    Extra Tube Hold for add-ons.     URINALYSIS MICROSCOPIC    RBC, UA 3      WBC, UA 1      Bacteria, UA Rare      Squamous Epithelial Cells, UA 1      Microscopic Comment            All Lab Results:  Results for orders placed or performed during the hospital encounter of 06/03/25   Influenza A & B by Molecular    Collection Time: 06/03/25 12:39 AM    Specimen: Nasal Swab   Result Value Ref Range    INFLUENZA A MOLECULAR Negative Negative    INFLUENZA B MOLECULAR  Negative Negative   COVID-19 Rapid Screening    Collection Time: 06/03/25 12:39 AM   Result Value Ref Range    SARS COV-2 Molecular Negative Negative   Hepatitis C Antibody    Collection Time: 06/03/25  1:37 AM   Result Value Ref Range    Hep C Ab Interp Negative Negative   HCV Virus Hold Specimen    Collection Time: 06/03/25  1:37 AM   Result Value Ref Range    Extra Tube Hold for add-ons.    HIV 1/2 Ag/Ab (4th Gen)    Collection Time: 06/03/25  1:37 AM   Result Value Ref Range    HIV 1/2 Ag/Ab Negative Negative   Comp. Metabolic Panel    Collection Time: 06/03/25   1:37 AM   Result Value Ref Range    Sodium 135 (L) 136 - 145 mmol/L    Potassium 3.4 (L) 3.5 - 5.1 mmol/L    Chloride 104 95 - 110 mmol/L    CO2 17 (L) 23 - 29 mmol/L    Glucose 158 (H) 70 - 110 mg/dL    BUN 8 6 - 20 mg/dL    Creatinine 0.7 0.5 - 1.4 mg/dL    Calcium 8.8 8.7 - 10.5 mg/dL    Protein Total 8.2 6.0 - 8.4 gm/dL    Albumin 3.6 3.5 - 5.2 g/dL    Bilirubin Total 0.4 0.1 - 1.0 mg/dL    ALP 97 40 - 150 unit/L    AST 26 11 - 45 unit/L    ALT 23 10 - 44 unit/L    Anion Gap 14 8 - 16 mmol/L    eGFR >60 >60 mL/min/1.73/m2   Lipase    Collection Time: 06/03/25  1:37 AM   Result Value Ref Range    Lipase Level 18 4 - 60 U/L   CBC with Differential    Collection Time: 06/03/25  1:37 AM   Result Value Ref Range    WBC 13.65 (H) 3.90 - 12.70 K/uL    RBC 4.18 4.00 - 5.40 M/uL    HGB 12.6 12.0 - 16.0 gm/dL    HCT 38.1 37.0 - 48.5 %    MCV 91 82 - 98 fL    MCH 30.1 27.0 - 31.0 pg    MCHC 33.1 32.0 - 36.0 g/dL    RDW 12.9 11.5 - 14.5 %    Platelet Count 363 150 - 450 K/uL    MPV 9.7 9.2 - 12.9 fL    Nucleated RBC 0 <=0 /100 WBC    Neut % 79.0 (H) 38 - 73 %    Lymph % 10.0 (L) 18 - 48 %    Mono % 6.9 4 - 15 %    Eos % 3.4 <=8 %    Basophil % 0.3 <=1.9 %    Imm Grans % 0.4 0.0 - 0.5 %    Neut # 10.78 (H) 1.8 - 7.7 K/uL    Lymph # 1.36 1 - 4.8 K/uL    Mono # 0.94 0.3 - 1 K/uL    Eos # 0.47 <=0.5 K/uL    Baso # 0.04 <=0.2 K/uL    Imm Grans # 0.06 (H) 0.00 - 0.04 K/uL   Urinalysis, Reflex to Urine Culture Urine, Clean Catch    Collection Time: 06/03/25  5:03 AM    Specimen: Urine   Result Value Ref Range    Color, UA Yellow Straw, Blanka, Yellow, Light-Orange    Appearance, UA Clear Clear    pH, UA 8.0 5.0 - 8.0    Spec Grav UA >=1.030 (A) 1.005 - 1.030    Protein, UA Negative Negative    Glucose, UA Negative Negative    Ketones, UA 2+ (A) Negative    Bilirubin, UA Negative Negative    Blood, UA 1+ (A) Negative    Nitrites, UA Negative Negative    Urobilinogen, UA Negative <2.0 EU/dL    Leukocyte Esterase, UA Negative  Negative   GREY TOP URINE HOLD    Collection Time: 06/03/25  5:03 AM   Result Value Ref Range    Extra Tube Hold for add-ons.    Urinalysis Microscopic    Collection Time: 06/03/25  5:03 AM   Result Value Ref Range    RBC, UA 3 0 - 4 /HPF    WBC, UA 1 0 - 5 /HPF    Bacteria, UA Rare None, Rare, Occasional /HPF    Squamous Epithelial Cells, UA 1 /HPF    Microscopic Comment         Imaging Results:  Imaging Results              US Abdomen Limited (Final result)  Result time 06/03/25 09:32:15      Final result by KYRIE Haro Sr., MD (06/03/25 09:32:15)                   Impression:      1. There is mild thickening of the wall of the gallbladder. There are several stones in the gallbladder. There is no pericholecystic fluid or sonographic Santiago sign.  2. Hepatomegaly      Electronically signed by: Duane Haro MD  Date:    06/03/2025  Time:    09:32               Narrative:    EXAMINATION:  US ABDOMEN LIMITED    CLINICAL HISTORY:  Cholecystitis;    TECHNIQUE:  Multiple static ultrasound images are submitted for interpretation.    COMPARISON:  Comparison was made to CT examination of the abdomen and pelvis performed on 06/03/2025.    FINDINGS:  The liver is enlarged.  It measures 18.9 cm in length. There is no intrahepatic biliary ductal dilatation. The common bile duct has a diameter of 3 mm.  There is mild thickening of the wall of the gallbladder.  The wall thickness measures 4 mm.  There are several stones in the gallbladder.  The largest is a 20 mm stone in the neck of the gallbladder.  There is no pericholecystic fluid or sonographic Santiago sign.  The visualized portion of the pancreas is normal in appearance. The right kidney is normal in appearance.  It measures 12.2 cm in length. The abdominal aorta and inferior vena cava are normal in appearance. The main portal vein has a normal venous waveform with flow directed towards the liver. It has a velocity of 26 cm/sec.                                         CT Abdomen Pelvis With IV Contrast NO Oral Contrast (Final result)  Result time 06/03/25 07:49:42      Final result by Leoncio Cardenas MD (06/03/25 07:49:42)                   Impression:      Borderline findings for colitis.    Cholelithiasis in a distended gallbladder.  Further evaluation as needed.  No wall thickening or local fluid to specifically suggest acute cholecystitis but this is difficult to exclude.    Borderline findings for cystitis.  Correlation with urinalysis.    Complete findings as above.    This report was flagged in Epic as abnormal.  The preliminary and final reports are concordant.    All CT scans at this facility are performed  using dose modulation techniques as appropriate to performed exam including the following:  automated exposure control; adjustment of mA and/or kV according to the patients size (this includes techniques or standardized protocols for targeted exams where dose is matched to indication/reason for exam: i.e. extremities or head);  iterative reconstruction technique.      Electronically signed by: Leoncio Cardenas  Date:    06/03/2025  Time:    07:49               Narrative:    EXAMINATION:  CT ABDOMEN PELVIS WITH IV CONTRAST    CLINICAL HISTORY:  LLQ abdominal pain;    TECHNIQUE:  Low dose axial images, sagittal and coronal reformations were obtained from the lung bases to the pubic symphysis.  Contrast was not administered.    COMPARISON:  None    FINDINGS:  Heart: Normal in size. No pericardial effusion.    Lung Bases: Well aerated, without consolidation or pleural fluid.    Liver: Normal in size and attenuation, with no focal hepatic lesions.    Gallbladder: Cholelithiasis.  Mild gallbladder distension.    Bile Ducts: Borderline dilation.  No stones.    Pancreas: No mass or peripancreatic fat stranding.    Spleen: Unremarkable.    Adrenals: Unremarkable.    Kidneys/ Ureters: Nonobstructing renal stones.  No hydronephrosis..    Bladder: Bladder wall thickening.   Correlation with urinalysis to exclude infection.    Reproductive organs: Unremarkable.    GI Tract/Mesentery: Mild wall thickening of the left colon.  This could relate to incomplete distension.  Correlation to exclude colitis.  This also affects the sigmoid colon.    Peritoneal Space: No ascites. No free air.    Retroperitoneum: No significant adenopathy.    Abdominal wall: Unremarkable.    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  Aorta intact.    Bones: No acute fracture.                                    Type of Interpretation: Outside Written Report  STAT Radiology Procedure Done:  CT Abdomen and Pelvis With Intravenous Contrast  Interpretation:  Mild thickening of the walls of the left colon and sigmoid. Incomplete distention versus mild colitis. Cholelithiasis in a distended gallbladder. Common bile duct. Non obstructing renal stones. Thickened urinary bladder. Check UTI/cystitis. 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  Radiologist:  Daniel Ochoa MD  6/3/2025  02:38       No EKG ordered.           The Emergency Provider reviewed the vital signs and test results, which are outlined above.     ED Discussion     4:07 AM: Discussed case with Dr. Chrissie Lopez MD ([facility]). Dr. Lopez agrees with current care and management of pt and accepts admission.   Admitting Service: [facility]  Admitting Physician: Dr. Lopez  Admit to: Obs/Tele Med    4:07 AM: Re-evaluated pt.  I have discussed test results, shared treatment plan, and the need for admission with patient/family/caretaker at bedside. Pt and/or family/caretaker express understanding at this time and agree with all information. All questions answered. Pt/caretaker/family member(s) have no further questions or concerns at this time. Pt is ready for admit.    ED Course as of 06/03/25 1833 Tue Jun 03, 2025 1833 Discussed with Dr. Styles with Gen Surg. Requested US study.  [BA]      ED Course User Index  [BA] Gaurang Pool MD     Medical  Decision Making  38 y/o F with 1d of abdominal pain, N/V. She is diffusely tender, mostly in LLQ, but some in the RUQ as well. Her WBC is elevated at 13k. VSS/AF. CT showing left colon and sigmoid thickening. Also, has cholelithiasis in a distended gallbladder, with stone in the neck. [place] CBD, no transaminitis or hyperbilirubinemia. Still in pain despite medications. Zosyn started.    Amount and/or Complexity of Data Reviewed  Labs: ordered. Decision-making details documented in ED Course.  Radiology: ordered. Decision-making details documented in ED Course.  Discussion of management or test interpretation with external provider(s): See ED course    Risk  Prescription drug management.  Risk Details: Differential Diagnosis includes, but is not limited to:  AAA, aortic dissection, mesenteric ischemia, perforated viscous, MI/ACS, SBO/volvulus, incarcerated/strangulated hernia, intussusception, ileus, appendicitis, cholecystitis, cholangitis, diverticulitis, esophagitis, hepatitis, nephrolithiasis, pancreatitis, gastroenteritis, colitis, IBD/IBS, biliary colic, GERD, PUD, constipation, UTI/pyelonephritis,  disorder.                   ED Medication(s):  Medications   piperacillin-tazobactam (ZOSYN) 4.5 g in D5W 100 mL IVPB (MB+) (4.5 g Intravenous New Bag 6/3/25 1430)   HYDROmorphone injection 0.5 mg (0.5 mg Intravenous Given 6/3/25 0911)   ondansetron injection 4 mg (4 mg Intravenous Given 6/3/25 0910)   hydrALAZINE injection 10 mg (has no administration in time range)   0.9% NaCl infusion ( Intravenous New Bag 6/3/25 1717)   sodium chloride 0.9% flush 3 mL (has no administration in time range)   acetaminophen tablet 650 mg (650 mg Oral Given 6/3/25 1512)   naloxone 0.4 mg/mL injection 0.02 mg (has no administration in time range)   glucose chewable tablet 16 g (has no administration in time range)   glucose chewable tablet 24 g (has no administration in time range)   dextrose 50% injection 12.5 g (has no  administration in time range)   dextrose 50% injection 25 g (has no administration in time range)   glucagon (human recombinant) injection 1 mg (has no administration in time range)   bisacodyL suppository 10 mg (has no administration in time range)   melatonin tablet 6 mg (has no administration in time range)   morphine injection 4 mg (4 mg Intravenous Given 6/3/25 0155)   ondansetron injection 4 mg (4 mg Intravenous Given 6/3/25 0155)   iohexoL (OMNIPAQUE 350) injection 100 mL (100 mLs Intravenous Given 6/3/25 0229)   promethazine (PHENERGAN) 12.5 mg in 0.9% NaCl 50 mL IVPB (0 mg Intravenous Stopped 6/3/25 0257)   morphine injection 4 mg (4 mg Intravenous Given 6/3/25 0241)   piperacillin-tazobactam (ZOSYN) 4.5 g in D5W 100 mL IVPB (MB+) (0 g Intravenous Stopped 6/3/25 0528)   HYDROmorphone injection 1 mg (1 mg Intravenous Given 6/3/25 0458)       Current Discharge Medication List                  Scribe Attestation:   Scribe #1: I performed the above scribed service and the documentation accurately describes the services I performed. I attest to the accuracy of the note.     Attending:   Physician Attestation Statement for Scribe #1: I, Gaurang Pool MD, personally performed the services described in this documentation, as scribed by Yvonne Mccarthy, in my presence, and it is both accurate and complete.           Clinical Impression       ICD-10-CM ICD-9-CM   1. Cholecystitis  K81.9 575.10   2. Colitis  K52.9 558.9   3. Chest pain  R07.9 786.50       Disposition:   Disposition: Placed in Observation  Condition: Stable         Gaurang Pool MD  06/03/25 6192

## 2025-06-03 NOTE — SUBJECTIVE & OBJECTIVE
No current facility-administered medications on file prior to encounter.     Current Outpatient Medications on File Prior to Encounter   Medication Sig    hydroCHLOROthiazide (MICROZIDE) 12.5 mg capsule hydrochlorothiazide Take No date recorded No form recorded No frequency recorded No route recorded No set duration recorded No set duration amount recorded active No dosage strength recorded No dosage strength units of measure recorded    ibuprofen (ADVIL,MOTRIN) 600 MG tablet Take 1 tablet (600 mg total) by mouth 3 (three) times daily.    oxyCODONE-acetaminophen (PERCOCET) 5-325 mg per tablet Take 1 tablet by mouth every 4 (four) hours as needed for Pain. for pain.       Review of patient's allergies indicates:  No Known Allergies    Past Medical History:   Diagnosis Date    Abnormal Pap smear of cervix     Essential (primary) hypertension     Pulmonary embolism      Past Surgical History:   Procedure Laterality Date     SECTION      DIAGNOSTIC LAPAROSCOPY  2023    DILATION AND CURETTAGE OF UTERUS       Family History    None       Tobacco Use    Smoking status: Some Days     Types: Cigarettes    Smokeless tobacco: Never   Substance and Sexual Activity    Alcohol use: Not on file    Drug use: Not on file    Sexual activity: Not on file     Review of Systems   Constitutional: Negative.  Negative for chills and fever.   HENT: Negative.     Eyes: Negative.    Respiratory:  Positive for chest tightness. Negative for cough, shortness of breath and wheezing.    Cardiovascular: Negative.  Negative for chest pain, palpitations and leg swelling.   Gastrointestinal:  Positive for abdominal pain, constipation, nausea and vomiting.   Endocrine: Negative.    Genitourinary: Negative.    Musculoskeletal: Negative.    Skin: Negative.    Allergic/Immunologic: Negative.    Neurological: Negative.    Psychiatric/Behavioral: Negative.     All other systems reviewed and are negative.    Objective:     Vital Signs (Most  Recent):  Temp: 98.5 °F (36.9 °C) (06/02/25 2127)  Pulse: 68 (06/03/25 1030)  Resp: (!) 22 (06/03/25 1030)  BP: 138/79 (06/03/25 1030)  SpO2: 100 % (06/03/25 1030) Vital Signs (24h Range):  Temp:  [98.5 °F (36.9 °C)] 98.5 °F (36.9 °C)  Pulse:  [66-88] 68  Resp:  [16-22] 22  SpO2:  [98 %-100 %] 100 %  BP: (136-195)/(71-95) 138/79     Weight: 104.3 kg (230 lb)  Body mass index is 38.27 kg/m².     Physical Exam  Constitutional:       General: She is not in acute distress.     Appearance: Normal appearance.   HENT:      Head: Normocephalic and atraumatic.   Eyes:      Extraocular Movements: Extraocular movements intact.      Conjunctiva/sclera: Conjunctivae normal.   Cardiovascular:      Rate and Rhythm: Normal rate and regular rhythm.   Pulmonary:      Effort: Pulmonary effort is normal.   Abdominal:      General: Abdomen is flat. There is no distension.      Palpations: Abdomen is soft. There is no mass.      Tenderness: There is no abdominal tenderness. There is no guarding or rebound.      Hernia: No hernia is present.   Musculoskeletal:         General: No swelling, tenderness, deformity or signs of injury. Normal range of motion.   Skin:     General: Skin is warm and dry.      Coloration: Skin is not jaundiced.   Neurological:      General: No focal deficit present.      Mental Status: She is alert and oriented to person, place, and time.   Psychiatric:         Mood and Affect: Mood normal.         Behavior: Behavior normal.         Thought Content: Thought content normal.            I have reviewed all pertinent lab results within the past 24 hours.  CBC:   Recent Labs   Lab 06/03/25  0137   WBC 13.65*   RBC 4.18   HGB 12.6   HCT 38.1      MCV 91   MCH 30.1   MCHC 33.1     BMP:   Recent Labs   Lab 06/03/25 0137   *   *   K 3.4*      CO2 17*   BUN 8   CREATININE 0.7   CALCIUM 8.8       Significant Diagnostics:  I have reviewed all pertinent imaging results/findings within the past 24  hours.  CT: I have reviewed all pertinent results/findings within the past 24 hours and my personal findings are:  Thickening of distal transverse, descending, and sigmoid colon with normal proximal colon.  Mildly distended gallbladder.  Ultrasound shows mild wall thickening but otherwise is unremarkable

## 2025-06-03 NOTE — CONSULTS
[facility]  General Surgery  Consult Note    Patient Name: Rhona Zaman  MRN: [id number]  Code Status: Full Code  Admission Date: 6/3/2025  Hospital Length of Stay: 0 days  Attending Physician: Moody Lawson MD  Primary Care Provider: Harini Carmichael Doctor    Patient information was obtained from patient, parent, past medical records, ER records, and primary team.     Inpatient consult to General surgery  Consult performed by: Lakeisha Styles MD  Consult ordered by: Gaurang Pool MD  Reason for consult: cholelithiasis and abdominal pain  Assessment/Recommendations:   -- recommend HIDA Scan  -- Recommend GI eval for colitis - which likely is causing her LLQ pain         Subjective:     Principal Problem: Pain of upper abdomen    History of Present Illness: Rhona Zaman is a 39 y.o. female as hypertension, and history of pulmonary embolism in 2020 (not on anticoagulation), and current smoking who presented to the ER last night with generalized abdominal pain as well as nausea, vomiting, constipation, and chest tightness.  She notes the pain is predominantly located in the left lower quadrant and up the left side of the abdomen, and states that she has been having this pain off and on for more than a year.  She notes that she had a hysterectomy about a year ago in attempts to deal with this pain however states the pain has only gotten worse.  In addition to her left lower quadrant pain which has improved with lying in the fetal position, she also does note some intermittent right upper quadrant and epigastric pain.  In addition she complains of nausea but without vomiting and decreased p.o. intake.  She is chronically constipated but did have some diarrhea last week.  She has never had a colonoscopy or an EGD.  In the ER she was afebrile, vitals were all within normal limits.  White count was mildly elevated at 13.65, the remainder of her labs were overall normal, including lipase and LFTs.  CT  abdomen and pelvis was done with IV contrast which on initial report showed mild thickening of left colon and sigmoid concerning for colitis as well as stones and a distended gallbladder.  Subsequent right upper quadrant ultrasound today showed mild thickening of the wall of the gallbladder (up to 4mm) with several stones, but no pericholecystic fluid or sonographic Santiago sign.     No current facility-administered medications on file prior to encounter.     Current Outpatient Medications on File Prior to Encounter   Medication Sig    hydroCHLOROthiazide (MICROZIDE) 12.5 mg capsule hydrochlorothiazide Take No date recorded No form recorded No frequency recorded No route recorded No set duration recorded No set duration amount recorded active No dosage strength recorded No dosage strength units of measure recorded    ibuprofen (ADVIL,MOTRIN) 600 MG tablet Take 1 tablet (600 mg total) by mouth 3 (three) times daily.    oxyCODONE-acetaminophen (PERCOCET) 5-325 mg per tablet Take 1 tablet by mouth every 4 (four) hours as needed for Pain. for pain.       Review of patient's allergies indicates:  No Known Allergies    Past Medical History:   Diagnosis Date    Abnormal Pap smear of cervix     Essential (primary) hypertension     Pulmonary embolism      Past Surgical History:   Procedure Laterality Date     SECTION      DIAGNOSTIC LAPAROSCOPY  2023    DILATION AND CURETTAGE OF UTERUS       Family History    None       Tobacco Use    Smoking status: Some Days     Types: Cigarettes    Smokeless tobacco: Never   Substance and Sexual Activity    Alcohol use: Not on file    Drug use: Not on file    Sexual activity: Not on file     Review of Systems   Constitutional: Negative.  Negative for chills and fever.   HENT: Negative.     Eyes: Negative.    Respiratory:  Positive for chest tightness. Negative for cough, shortness of breath and wheezing.    Cardiovascular: Negative.  Negative for chest pain, palpitations  and leg swelling.   Gastrointestinal:  Positive for abdominal pain, constipation, nausea and vomiting.   Endocrine: Negative.    Genitourinary: Negative.    Musculoskeletal: Negative.    Skin: Negative.    Allergic/Immunologic: Negative.    Neurological: Negative.    Psychiatric/Behavioral: Negative.     All other systems reviewed and are negative.    Objective:     Vital Signs (Most Recent):  Temp: 98.5 °F (36.9 °C) (06/02/25 2127)  Pulse: 68 (06/03/25 1030)  Resp: (!) 22 (06/03/25 1030)  BP: 138/79 (06/03/25 1030)  SpO2: 100 % (06/03/25 1030) Vital Signs (24h Range):  Temp:  [98.5 °F (36.9 °C)] 98.5 °F (36.9 °C)  Pulse:  [66-88] 68  Resp:  [16-22] 22  SpO2:  [98 %-100 %] 100 %  BP: (136-195)/(71-95) 138/79     Weight: 104.3 kg (230 lb)  Body mass index is 38.27 kg/m².     Physical Exam  Constitutional:       General: She is not in acute distress.     Appearance: Normal appearance.   HENT:      Head: Normocephalic and atraumatic.   Eyes:      Extraocular Movements: Extraocular movements intact.      Conjunctiva/sclera: Conjunctivae normal.   Cardiovascular:      Rate and Rhythm: Normal rate and regular rhythm.   Pulmonary:      Effort: Pulmonary effort is normal.   Abdominal:      General: Abdomen is flat. There is no distension.      Palpations: Abdomen is soft. There is no mass.      Tenderness: There is no abdominal tenderness. There is no guarding or rebound.      Hernia: No hernia is present.   Musculoskeletal:         General: No swelling, tenderness, deformity or signs of injury. Normal range of motion.   Skin:     General: Skin is warm and dry.      Coloration: Skin is not jaundiced.   Neurological:      General: No focal deficit present.      Mental Status: She is alert and oriented to person, place, and time.   Psychiatric:         Mood and Affect: Mood normal.         Behavior: Behavior normal.         Thought Content: Thought content normal.            I have reviewed all pertinent lab results within  the past 24 hours.  CBC:   Recent Labs   Lab 06/03/25  0137   WBC 13.65*   RBC 4.18   HGB 12.6   HCT 38.1      MCV 91   MCH 30.1   MCHC 33.1     BMP:   Recent Labs   Lab 06/03/25  0137   *   *   K 3.4*      CO2 17*   BUN 8   CREATININE 0.7   CALCIUM 8.8       Significant Diagnostics:  I have reviewed all pertinent imaging results/findings within the past 24 hours.  CT: I have reviewed all pertinent results/findings within the past 24 hours and my personal findings are:  Thickening of distal transverse, descending, and sigmoid colon with normal proximal colon.  Mildly distended gallbladder.  Ultrasound shows mild wall thickening but otherwise is unremarkable    Assessment/Plan:     * Pain of upper abdomen  Discussed with her that upper abdominal pain has a wide differential.  On imaging her colitis extensor in the left colon all the way to the looks like it is involving at least part of the transverse.  In addition she could have cholecystitis (although this seems little less likely), or could even have an ulcer.    -- recommend HIDA Scan   -- recommend GI eval for colitis or underlying IBD    Renal artery aneurysm  The management as per primary team and vascular    Essential hypertension  Management as per primary team    Colitis  -- Recommend GI eval for underlying IBD in the setting of chronic lower abdominal pain     Cholelithiasis  Relatively confusing picture especially since the bulk of her pain that she has is located in the left lower quadrant which is not consistent with cholecystitis.  Is certainly possible that the nausea and the right upper quadrant pain and even some of the epigastric pain may be due to her gallbladder.  Due to this confusing picture I would recommend obtaining a HIDA scan.  I discussed with the patient and her mother extensively that if her HIDA scan is normal I would not recommend removing her gallbladder as I do not feel that that would improve her pain or  her symptoms at this time.  If however the HIDA scan fails to show filling her gallbladder I certainly think it is reasonable to remove her gallbladder but with the understanding that it likely will not resolve her left lower quadrant pain.        VTE Risk Mitigation (From admission, onward)           Ordered     Reason for No Pharmacological VTE Prophylaxis  Once        Comments: May need surgery   Question:  Reasons:  Answer:  Physician Provided (leave comment)    06/03/25 0741     IP VTE HIGH RISK PATIENT  Once         06/03/25 0741     Place sequential compression device  Until discontinued         06/03/25 0741                    Thank you for your consult. I will follow-up with patient. Please contact us if you have any additional questions.    Lakeisha Styles MD  General Surgery  [facility]

## 2025-06-04 LAB
ABSOLUTE EOSINOPHIL (OHS): 0.08 K/UL
ABSOLUTE MONOCYTE (OHS): 0.75 K/UL (ref 0.3–1)
ABSOLUTE NEUTROPHIL COUNT (OHS): 7.09 K/UL (ref 1.8–7.7)
ALBUMIN SERPL BCP-MCNC: 2.8 G/DL (ref 3.5–5.2)
ALP SERPL-CCNC: 81 UNIT/L (ref 40–150)
ALT SERPL W/O P-5'-P-CCNC: 19 UNIT/L (ref 10–44)
ANION GAP (OHS): 8 MMOL/L (ref 8–16)
AST SERPL-CCNC: 15 UNIT/L (ref 11–45)
BASOPHILS # BLD AUTO: 0.04 K/UL
BASOPHILS NFR BLD AUTO: 0.4 %
BILIRUB SERPL-MCNC: 0.5 MG/DL (ref 0.1–1)
BUN SERPL-MCNC: 5 MG/DL (ref 6–20)
CALCIUM SERPL-MCNC: 7.5 MG/DL (ref 8.7–10.5)
CHLORIDE SERPL-SCNC: 109 MMOL/L (ref 95–110)
CO2 SERPL-SCNC: 21 MMOL/L (ref 23–29)
CREAT SERPL-MCNC: 0.6 MG/DL (ref 0.5–1.4)
ERYTHROCYTE [DISTWIDTH] IN BLOOD BY AUTOMATED COUNT: 13.2 % (ref 11.5–14.5)
GFR SERPLBLD CREATININE-BSD FMLA CKD-EPI: >60 ML/MIN/1.73/M2
GLUCOSE SERPL-MCNC: 98 MG/DL (ref 70–110)
HCT VFR BLD AUTO: 35.1 % (ref 37–48.5)
HGB BLD-MCNC: 11.5 GM/DL (ref 12–16)
IMM GRANULOCYTES # BLD AUTO: 0.04 K/UL (ref 0–0.04)
IMM GRANULOCYTES NFR BLD AUTO: 0.4 % (ref 0–0.5)
LYMPHOCYTES # BLD AUTO: 1.64 K/UL (ref 1–4.8)
MAGNESIUM SERPL-MCNC: 1.9 MG/DL (ref 1.6–2.6)
MCH RBC QN AUTO: 30.1 PG (ref 27–31)
MCHC RBC AUTO-ENTMCNC: 32.8 G/DL (ref 32–36)
MCV RBC AUTO: 92 FL (ref 82–98)
NUCLEATED RBC (/100WBC) (OHS): 0 /100 WBC
PLATELET # BLD AUTO: 321 K/UL (ref 150–450)
PMV BLD AUTO: 9.9 FL (ref 9.2–12.9)
POTASSIUM SERPL-SCNC: 2.9 MMOL/L (ref 3.5–5.1)
PROT SERPL-MCNC: 6.5 GM/DL (ref 6–8.4)
RBC # BLD AUTO: 3.82 M/UL (ref 4–5.4)
RELATIVE EOSINOPHIL (OHS): 0.8 %
RELATIVE LYMPHOCYTE (OHS): 17 % (ref 18–48)
RELATIVE MONOCYTE (OHS): 7.8 % (ref 4–15)
RELATIVE NEUTROPHIL (OHS): 73.6 % (ref 38–73)
SODIUM SERPL-SCNC: 138 MMOL/L (ref 136–145)
WBC # BLD AUTO: 9.64 K/UL (ref 3.9–12.7)

## 2025-06-04 PROCEDURE — A9537 TC99M MEBROFENIN: HCPCS | Performed by: FAMILY MEDICINE

## 2025-06-04 PROCEDURE — 94761 N-INVAS EAR/PLS OXIMETRY MLT: CPT

## 2025-06-04 PROCEDURE — 94799 UNLISTED PULMONARY SVC/PX: CPT

## 2025-06-04 PROCEDURE — 36415 COLL VENOUS BLD VENIPUNCTURE: CPT | Performed by: NURSE PRACTITIONER

## 2025-06-04 PROCEDURE — 80053 COMPREHEN METABOLIC PANEL: CPT | Performed by: NURSE PRACTITIONER

## 2025-06-04 PROCEDURE — 63600175 PHARM REV CODE 636 W HCPCS: Performed by: STUDENT IN AN ORGANIZED HEALTH CARE EDUCATION/TRAINING PROGRAM

## 2025-06-04 PROCEDURE — 83735 ASSAY OF MAGNESIUM: CPT | Performed by: FAMILY MEDICINE

## 2025-06-04 PROCEDURE — 25000003 PHARM REV CODE 250: Performed by: NURSE PRACTITIONER

## 2025-06-04 PROCEDURE — 25000003 PHARM REV CODE 250: Performed by: COLON & RECTAL SURGERY

## 2025-06-04 PROCEDURE — 25000003 PHARM REV CODE 250: Performed by: FAMILY MEDICINE

## 2025-06-04 PROCEDURE — 63600175 PHARM REV CODE 636 W HCPCS: Performed by: RADIOLOGY

## 2025-06-04 PROCEDURE — 63600175 PHARM REV CODE 636 W HCPCS: Performed by: NURSE PRACTITIONER

## 2025-06-04 PROCEDURE — 11000001 HC ACUTE MED/SURG PRIVATE ROOM

## 2025-06-04 PROCEDURE — G0378 HOSPITAL OBSERVATION PER HR: HCPCS

## 2025-06-04 PROCEDURE — 36415 COLL VENOUS BLD VENIPUNCTURE: CPT | Performed by: FAMILY MEDICINE

## 2025-06-04 PROCEDURE — 85025 COMPLETE CBC W/AUTO DIFF WBC: CPT | Performed by: NURSE PRACTITIONER

## 2025-06-04 RX ORDER — HYDROCHLOROTHIAZIDE 12.5 MG/1
12.5 TABLET ORAL DAILY
Status: DISCONTINUED | OUTPATIENT
Start: 2025-06-04 | End: 2025-06-06 | Stop reason: HOSPADM

## 2025-06-04 RX ORDER — POTASSIUM CHLORIDE 20 MEQ/1
40 TABLET, EXTENDED RELEASE ORAL EVERY 4 HOURS
Status: COMPLETED | OUTPATIENT
Start: 2025-06-04 | End: 2025-06-04

## 2025-06-04 RX ORDER — KIT FOR THE PREPARATION OF TECHNETIUM TC 99M MEBROFENIN 45 MG/10ML
6 INJECTION, POWDER, LYOPHILIZED, FOR SOLUTION INTRAVENOUS
Status: COMPLETED | OUTPATIENT
Start: 2025-06-04 | End: 2025-06-04

## 2025-06-04 RX ORDER — MORPHINE SULFATE 4 MG/ML
2 INJECTION, SOLUTION INTRAMUSCULAR; INTRAVENOUS ONCE
Refills: 0 | Status: COMPLETED | OUTPATIENT
Start: 2025-06-04 | End: 2025-06-04

## 2025-06-04 RX ADMIN — POTASSIUM CHLORIDE 40 MEQ: 1500 TABLET, EXTENDED RELEASE ORAL at 09:06

## 2025-06-04 RX ADMIN — PIPERACILLIN AND TAZOBACTAM 4.5 G: 4; .5 INJECTION, POWDER, LYOPHILIZED, FOR SOLUTION INTRAVENOUS; PARENTERAL at 08:06

## 2025-06-04 RX ADMIN — KIT FOR THE PREPARATION OF TECHNETIUM TC 99M MEBROFENIN 6 MILLICURIE: 45 INJECTION, POWDER, LYOPHILIZED, FOR SOLUTION INTRAVENOUS at 07:06

## 2025-06-04 RX ADMIN — MORPHINE SULFATE 2 MG: 4 INJECTION INTRAVENOUS at 08:06

## 2025-06-04 RX ADMIN — HYDROMORPHONE HYDROCHLORIDE 0.5 MG: 1 INJECTION, SOLUTION INTRAMUSCULAR; INTRAVENOUS; SUBCUTANEOUS at 01:06

## 2025-06-04 RX ADMIN — PIPERACILLIN AND TAZOBACTAM 4.5 G: 4; .5 INJECTION, POWDER, LYOPHILIZED, FOR SOLUTION INTRAVENOUS; PARENTERAL at 04:06

## 2025-06-04 RX ADMIN — HYDROCHLOROTHIAZIDE 12.5 MG: 12.5 TABLET ORAL at 01:06

## 2025-06-04 RX ADMIN — SODIUM CHLORIDE: 9 INJECTION, SOLUTION INTRAVENOUS at 04:06

## 2025-06-04 RX ADMIN — POTASSIUM CHLORIDE 40 MEQ: 1500 TABLET, EXTENDED RELEASE ORAL at 01:06

## 2025-06-04 RX ADMIN — PIPERACILLIN AND TAZOBACTAM 4.5 G: 4; .5 INJECTION, POWDER, LYOPHILIZED, FOR SOLUTION INTRAVENOUS; PARENTERAL at 01:06

## 2025-06-04 RX ADMIN — HYDROMORPHONE HYDROCHLORIDE 0.5 MG: 1 INJECTION, SOLUTION INTRAMUSCULAR; INTRAVENOUS; SUBCUTANEOUS at 09:06

## 2025-06-04 NOTE — PLAN OF CARE
[facility]  Initial Discharge Assessment       Primary Care Provider: No, Primary Doctor    Admission Diagnosis: Cholecystitis [K81.9]  Colitis [K52.9]  Chest pain [R07.9]    Admission Date: 6/3/2025  Expected Discharge Date:     Transition of Care Barriers: Underinsured    Payor: MEDICAID / Plan: [facility] COMMUNITY PLAN [facility] ([place] MEDICAID) / Product Type: Managed Medicaid /     Extended Emergency Contact Information  Primary Emergency Contact: Holly Zhang   [facility]  Home Phone: [phone]  Relation: Mother    Discharge Plan A: Home with family         [id number] - [place], [place] - [address] AT [facility] [address]  [address]  [place] [address]  Phone: [phone] Fax: [phone]      Initial Assessment (most recent)       Adult Discharge Assessment - 06/04/25 1301          Discharge Assessment    Assessment Type Discharge Planning Assessment     Confirmed/corrected address, phone number and insurance Yes     Confirmed Demographics Correct on Facesheet     Source of Information patient     Communicated ALINA with patient/caregiver Date not available/Unable to determine     People in Home child(steven), dependent     Facility Arrived From: home     Do you expect to return to your current living situation? Yes     Do you have help at home or someone to help you manage your care at home? Yes     Who are your caregiver(s) and their phone number(s)? mother     Prior to hospitilization cognitive status: Alert/Oriented     Current cognitive status: Alert/Oriented     Walking or Climbing Stairs Difficulty no     Dressing/Bathing Difficulty no     Equipment Currently Used at Home none     Readmission within 30 days? No     Patient currently being followed by outpatient case management? No     Do you currently have service(s) that help you manage your care at home? No     Do you take prescription medications? Yes     Do you have prescription  coverage? Yes     Do you have any problems affording any of your prescribed medications? TBD     Is the patient taking medications as prescribed? yes     Who is going to help you get home at discharge? family     How do you get to doctors appointments? car, drives self     Are you on dialysis? No     Do you take coumadin? No     Discharge Plan A Home with family     DME Needed Upon Discharge  none     Discharge Plan discussed with: Patient     Transition of Care Barriers Underinsured                      Anticipated DC Dispo: home with children  Prior Level of Function: independent, works at [email or web address]. Drives self.  PCP: Star Harris ([facility])  Comments:  CM met with patient at bedside to introduce role and discuss d/c planning. Patient is independent, confirms family can provide transportation home upon discharge. Patient states she is having surgery tomorrow and will have to stay home from work for 6 weeks post op. CM discussed possibly applying for FMLA through employer if patient has benefits. Patient states she will contact employer to discuss options.    CM following and will remain available.

## 2025-06-04 NOTE — ASSESSMENT & PLAN NOTE
Positive HIDA.  Discussed removal of gallbladder.  Re-iterated that I do not expect it to resolve her LLQ pain.  One of my partners will eval for cholecystectomy tomorrow or the following day.     -- plan for cholecystectomy this admission   -- ok for diet today   -- remainder of care as per primary team

## 2025-06-04 NOTE — SUBJECTIVE & OBJECTIVE
Interval History: AFVSS.  KHANH.  Feeling better after having BM.  HIDA positive.     Medications:  Continuous Infusions:   0.9% NaCl   Intravenous Continuous 150 mL/hr at 06/04/25 0403 New Bag at 06/04/25 0403     Scheduled Meds:   hydroCHLOROthiazide  12.5 mg Oral Daily    piperacillin-tazobactam (Zosyn) IV (PEDS and ADULTS) (extended infusion is not appropriate)  4.5 g Intravenous Q8H     PRN Meds:  Current Facility-Administered Medications:     acetaminophen, 650 mg, Oral, Q4H PRN    bisacodyL, 10 mg, Rectal, Daily PRN    dextrose 50%, 12.5 g, Intravenous, PRN    dextrose 50%, 25 g, Intravenous, PRN    glucagon (human recombinant), 1 mg, Intramuscular, PRN    glucose, 16 g, Oral, PRN    glucose, 24 g, Oral, PRN    hydrALAZINE, 10 mg, Intravenous, Q6H PRN    HYDROmorphone, 0.5 mg, Intravenous, Q4H PRN    melatonin, 6 mg, Oral, Nightly PRN    naloxone, 0.02 mg, Intravenous, PRN    ondansetron, 4 mg, Intravenous, Q6H PRN    sodium chloride 0.9%, 3 mL, Intravenous, Q8H PRN     Review of patient's allergies indicates:  No Known Allergies  Objective:     Vital Signs (Most Recent):  Temp: 98.3 °F (36.8 °C) (06/04/25 1620)  Pulse: 81 (06/04/25 1620)  Resp: 18 (06/04/25 1620)  BP: (!) 160/91 (06/04/25 1620)  SpO2: 100 % (06/04/25 1620) Vital Signs (24h Range):  Temp:  [97.6 °F (36.4 °C)-99.1 °F (37.3 °C)] 98.3 °F (36.8 °C)  Pulse:  [64-88] 81  Resp:  [16-18] 18  SpO2:  [96 %-100 %] 100 %  BP: (113-160)/(57-91) 160/91     Weight: 104.3 kg (230 lb)  Body mass index is 38.27 kg/m².    Intake/Output - Last 3 Shifts         06/02 0700  06/03 0659 06/03 0700  06/04 0659 06/04 0700  06/05 0659    IV Piggyback 50      Total Intake(mL/kg) 50 (0.5)      Net +50             Unmeasured Stool Occurrence  1 x 1 x             Physical Exam  Constitutional:       General: She is not in acute distress.     Appearance: Normal appearance.   HENT:      Head: Normocephalic and atraumatic.   Eyes:      Extraocular Movements: Extraocular  movements intact.      Conjunctiva/sclera: Conjunctivae normal.   Cardiovascular:      Rate and Rhythm: Normal rate and regular rhythm.   Pulmonary:      Effort: Pulmonary effort is normal.   Abdominal:      General: Abdomen is flat. There is no distension.      Palpations: Abdomen is soft. There is no mass.      Tenderness: There is no abdominal tenderness. There is no guarding or rebound.      Hernia: No hernia is present.   Musculoskeletal:         General: No swelling, tenderness, deformity or signs of injury. Normal range of motion.   Skin:     General: Skin is warm and dry.      Coloration: Skin is not jaundiced.   Neurological:      General: No focal deficit present.      Mental Status: She is alert and oriented to person, place, and time.   Psychiatric:         Mood and Affect: Mood normal.         Behavior: Behavior normal.         Thought Content: Thought content normal.          Significant Labs:  I have reviewed all pertinent lab results within the past 24 hours.  CBC:   Recent Labs   Lab 06/04/25  0324   WBC 9.64   RBC 3.82*   HGB 11.5*   HCT 35.1*      MCV 92   MCH 30.1   MCHC 32.8     BMP:   Recent Labs   Lab 06/04/25  0324 06/04/25  1101   GLU 98  --      --    K 2.9*  --      --    CO2 21*  --    BUN 5*  --    CREATININE 0.6  --    CALCIUM 7.5*  --    MG  --  1.9       Significant Diagnostics:  I have reviewed all pertinent imaging results/findings within the past 24 hours.no filling of gallbladder on HIDA scan

## 2025-06-04 NOTE — SUBJECTIVE & OBJECTIVE
Interval History:  No acute issues overnight.  Patient is still reports some abdominal pain although slightly improved.      Review of Systems   Constitutional:  Negative for fatigue and fever.   HENT:  Negative for sinus pressure.    Eyes:  Negative for visual disturbance.   Respiratory:  Negative for shortness of breath.    Cardiovascular:  Negative for chest pain.   Gastrointestinal:  Positive for abdominal pain. Negative for nausea and vomiting.   Genitourinary:  Negative for difficulty urinating.   Musculoskeletal:  Negative for back pain.   Skin:  Negative for rash.   Neurological:  Negative for headaches.   Psychiatric/Behavioral:  Negative for confusion.      Objective:     Vital Signs (Most Recent):  Temp: 99.1 °F (37.3 °C) (06/04/25 0457)  Pulse: 71 (06/04/25 0725)  Resp: 16 (06/04/25 0853)  BP: 116/62 (06/04/25 0457)  SpO2: 99 % (06/04/25 0457) Vital Signs (24h Range):  Temp:  [97.6 °F (36.4 °C)-99.1 °F (37.3 °C)] 99.1 °F (37.3 °C)  Pulse:  [64-87] 71  Resp:  [16-26] 16  SpO2:  [96 %-100 %] 99 %  BP: (113-164)/(57-81) 116/62     Weight: 104.3 kg (230 lb)  Body mass index is 38.27 kg/m².  No intake or output data in the 24 hours ending 06/04/25 1015      Physical Exam  Constitutional:       General: She is not in acute distress.     Appearance: She is well-developed. She is obese. She is not diaphoretic.   HENT:      Head: Normocephalic and atraumatic.   Eyes:      Pupils: Pupils are equal, round, and reactive to light.   Cardiovascular:      Rate and Rhythm: Normal rate and regular rhythm.      Heart sounds: Normal heart sounds. No murmur heard.     No friction rub. No gallop.   Pulmonary:      Effort: Pulmonary effort is normal. No respiratory distress.      Breath sounds: Normal breath sounds. No stridor. No wheezing or rales.   Abdominal:      General: Bowel sounds are normal. There is no distension.      Palpations: Abdomen is soft. There is no mass.      Tenderness: There is abdominal tenderness.  There is no guarding.   Skin:     General: Skin is warm.      Findings: No erythema.   Neurological:      Mental Status: She is alert and oriented to person, place, and time.               Significant Labs: All pertinent labs within the past 24 hours have been reviewed.    Significant Imaging: I have reviewed all pertinent imaging results/findings within the past 24 hours.

## 2025-06-04 NOTE — ASSESSMENT & PLAN NOTE
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    Continue empiric Zosyn.  Surgery on case   HIDA scan ordered   Patient may need GI consult

## 2025-06-04 NOTE — PROGRESS NOTES
[facility]  General Surgery  Progress Note    Subjective:     History of Present Illness:  Rhona Zaman is a 39 y.o. female as hypertension, and history of pulmonary embolism in 2020 (not on anticoagulation), and current smoking who presented to the ER last night with generalized abdominal pain as well as nausea, vomiting, constipation, and chest tightness.  She notes the pain is predominantly located in the left lower quadrant and up the left side of the abdomen, and states that she has been having this pain off and on for more than a year.  She notes that she had a hysterectomy about a year ago in attempts to deal with this pain however states the pain has only gotten worse.  In addition to her left lower quadrant pain which has improved with lying in the fetal position, she also does note some intermittent right upper quadrant and epigastric pain.  In addition she complains of nausea but without vomiting and decreased p.o. intake.  She is chronically constipated but did have some diarrhea last week.  She has never had a colonoscopy or an EGD.  In the ER she was afebrile, vitals were all within normal limits.  White count was mildly elevated at 13.65, the remainder of her labs were overall normal, including lipase and LFTs.  CT abdomen and pelvis was done with IV contrast which on initial report showed mild thickening of left colon and sigmoid concerning for colitis as well as stones and a distended gallbladder.  Subsequent right upper quadrant ultrasound today showed mild thickening of the wall of the gallbladder (up to 4mm) with several stones, but no pericholecystic fluid or sonographic Santiago sign.     Post-Op Info:  * No surgery found *         Interval History: AFVSS.  KHANH.  Feeling better after having BM.  HIDA positive.     Medications:  Continuous Infusions:   0.9% NaCl   Intravenous Continuous 150 mL/hr at 06/04/25 0403 New Bag at 06/04/25 0403     Scheduled Meds:   hydroCHLOROthiazide   12.5 mg Oral Daily    piperacillin-tazobactam (Zosyn) IV (PEDS and ADULTS) (extended infusion is not appropriate)  4.5 g Intravenous Q8H     PRN Meds:  Current Facility-Administered Medications:     acetaminophen, 650 mg, Oral, Q4H PRN    bisacodyL, 10 mg, Rectal, Daily PRN    dextrose 50%, 12.5 g, Intravenous, PRN    dextrose 50%, 25 g, Intravenous, PRN    glucagon (human recombinant), 1 mg, Intramuscular, PRN    glucose, 16 g, Oral, PRN    glucose, 24 g, Oral, PRN    hydrALAZINE, 10 mg, Intravenous, Q6H PRN    HYDROmorphone, 0.5 mg, Intravenous, Q4H PRN    melatonin, 6 mg, Oral, Nightly PRN    naloxone, 0.02 mg, Intravenous, PRN    ondansetron, 4 mg, Intravenous, Q6H PRN    sodium chloride 0.9%, 3 mL, Intravenous, Q8H PRN     Review of patient's allergies indicates:  No Known Allergies  Objective:     Vital Signs (Most Recent):  Temp: 98.3 °F (36.8 °C) (06/04/25 1620)  Pulse: 81 (06/04/25 1620)  Resp: 18 (06/04/25 1620)  BP: (!) 160/91 (06/04/25 1620)  SpO2: 100 % (06/04/25 1620) Vital Signs (24h Range):  Temp:  [97.6 °F (36.4 °C)-99.1 °F (37.3 °C)] 98.3 °F (36.8 °C)  Pulse:  [64-88] 81  Resp:  [16-18] 18  SpO2:  [96 %-100 %] 100 %  BP: (113-160)/(57-91) 160/91     Weight: 104.3 kg (230 lb)  Body mass index is 38.27 kg/m².    Intake/Output - Last 3 Shifts         06/02 0700  06/03 0659 06/03 0700  06/04 0659 06/04 0700  06/05 0659    IV Piggyback 50      Total Intake(mL/kg) 50 (0.5)      Net +50             Unmeasured Stool Occurrence  1 x 1 x             Physical Exam  Constitutional:       General: She is not in acute distress.     Appearance: Normal appearance.   HENT:      Head: Normocephalic and atraumatic.   Eyes:      Extraocular Movements: Extraocular movements intact.      Conjunctiva/sclera: Conjunctivae normal.   Cardiovascular:      Rate and Rhythm: Normal rate and regular rhythm.   Pulmonary:      Effort: Pulmonary effort is normal.   Abdominal:      General: Abdomen is flat. There is no distension.       Palpations: Abdomen is soft. There is no mass.      Tenderness: There is no abdominal tenderness. There is no guarding or rebound.      Hernia: No hernia is present.   Musculoskeletal:         General: No swelling, tenderness, deformity or signs of injury. Normal range of motion.   Skin:     General: Skin is warm and dry.      Coloration: Skin is not jaundiced.   Neurological:      General: No focal deficit present.      Mental Status: She is alert and oriented to person, place, and time.   Psychiatric:         Mood and Affect: Mood normal.         Behavior: Behavior normal.         Thought Content: Thought content normal.          Significant Labs:  I have reviewed all pertinent lab results within the past 24 hours.  CBC:   Recent Labs   Lab 06/04/25  0324   WBC 9.64   RBC 3.82*   HGB 11.5*   HCT 35.1*      MCV 92   MCH 30.1   MCHC 32.8     BMP:   Recent Labs   Lab 06/04/25 0324 06/04/25  1101   GLU 98  --      --    K 2.9*  --      --    CO2 21*  --    BUN 5*  --    CREATININE 0.6  --    CALCIUM 7.5*  --    MG  --  1.9       Significant Diagnostics:  I have reviewed all pertinent imaging results/findings within the past 24 hours.no filling of gallbladder on HIDA scan    Assessment/Plan:     * Pain of upper abdomen  Positive HIDA.  Discussed removal of gallbladder.  Re-iterated that I do not expect it to resolve her LLQ pain.  One of my partners will eval for cholecystectomy tomorrow or the following day.     -- plan for cholecystectomy this admission   -- ok for diet today   -- remainder of care as per primary team     Renal artery aneurysm  The management as per primary team and vascular    Essential hypertension  Management as per primary team    Colitis  -- Recommend GI eval for underlying IBD in the setting of chronic lower abdominal pain     Cholelithiasis  Plan for cholecystectomy tomorrow or Friday depending on schedules.         Lakeisha Styles MD  General Surgery  [facility]

## 2025-06-04 NOTE — ASSESSMENT & PLAN NOTE
Patient's blood pressure range in the last 24 hours was: BP  Min: 113/57  Max: 164/81.The patient's inpatient anti-hypertensive regimen is listed below:  Current Antihypertensives  hydrALAZINE injection 10 mg, Every 6 hours PRN, Intravenous    Plan  - BP is controlled, no changes needed to their regimen  - Pain control helped with BP, holding home HCTZ for now

## 2025-06-04 NOTE — HOSPITAL COURSE
06/04/2025  Patient reports mild improvement in abdominal pain.  HIDA scan pending this a.m..  Continue empiric Zosyn.  Hypokalemia noted.  Will replete.  6/5 awaiting lap andra per surgery. +anxiety. Complains of abdominal pain.    6/6   Post op day 1 status post robotic andra per surgery. Tolerated well. Received empiric intravenous antibiotic(s). Diet advanced to full liquid. After discussing with surgery, ok to discharge home. Counseling provided regarding avoidance of constipation and remaining regular given abdominal surgeries. Abdominal discomfort with bloating. Calprotectin within normal limits.     No acute distress. No respiratory distress. On room air. Abdominal distended but soft. Tenderness to palpation. Laparoscopic incisions healing well with no overt signs or symptoms of infection. Obese. AO3. Anxious

## 2025-06-04 NOTE — PLAN OF CARE
Discussed poc with pt, pt verbalized understanding  Purposeful rounding every 2hours  VS wnl  Fall precautions in place, remains injury free  Pain and nausea under control with PRN meds  IVFs  Accurate I&Os  Abx given as prescribed  Bed locked at lowest position  Call light within reach  Chart check complete  Will cont with POC  Problem: Adult Inpatient Plan of Care  Goal: Plan of Care Review  6/4/2025 1820 by Laci Corral RN  Outcome: Progressing  6/4/2025 1820 by Laci Corral RN  Outcome: Progressing  Goal: Patient-Specific Goal (Individualized)  6/4/2025 1820 by Laci Corral RN  Outcome: Progressing  6/4/2025 1820 by Laci Corral RN  Outcome: Progressing  Goal: Absence of Hospital-Acquired Illness or Injury  6/4/2025 1820 by Laci Corral RN  Outcome: Progressing  6/4/2025 1820 by Laci Corrla RN  Outcome: Progressing  Goal: Optimal Comfort and Wellbeing  6/4/2025 1820 by Laci Corral RN  Outcome: Progressing  6/4/2025 1820 by Laci Corral RN  Outcome: Progressing  Goal: Readiness for Transition of Care  6/4/2025 1820 by Laci Corral RN  Outcome: Progressing  6/4/2025 1820 by Laci Corral RN  Outcome: Progressing     Problem: Infection  Goal: Absence of Infection Signs and Symptoms  6/4/2025 1820 by Laci Corral RN  Outcome: Progressing  6/4/2025 1820 by Laci Corral RN  Outcome: Progressing

## 2025-06-04 NOTE — PROGRESS NOTES
[facility]  [facility]  Progress Note    Patient Name: Rhona Zaman  MRN: [id number]  Patient Class: OP- Observation   Admission Date: 6/3/2025  Length of Stay: 0 days  Attending Physician: Moody Lawson MD  Primary Care Provider: Jany, Primary Doctor        Subjective     Principal Problem:Pain of upper abdomen        HPI:   Patient is a 39-year-old female with past medical history significant for hypertension, pulmonary embolism in 2020, and tobacco abuse who presented  to ED with complaint of generalized abdominal pain. She also reports nausea, vomiting, constipation, and chest tightness. She denies vomiting, dysuria, fever, chills, back pain, shortness of breath, productive cough, weakness, edema. on arrival to ED, temp 98.5°, heart rate 88, respirations 17, blood pressure 140/82, 98% SpO2 on room air. lab workup shows WBC 13.65, hemoglobin 12.6, hematocrit 38.1, platelets 363, sodium 135, potassium 3.4, chloride 104, CO2 17, BUN 8, creatinine 0.7, glucose 158, alk-phos 97, AST 26, ALT 23, lipase 18, negative covid 19 screening, negative influenza A/B screening, rapid HIV negative, UA  done with no signs of infection.  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 While in ED, she was given IV Zosyn, dilaudid, morphine, zofran, and phenergan. General surgery was consulted per ED. Hospital Medicine was consulted for admission due to abdominal pain. General surgery recommended US gallbladder, which is ordered, pending.    Overview/Hospital Course:  06/04/2025  Patient reports mild improvement in abdominal pain.  HIDA scan pending this a.m..  Continue empiric Zosyn.  Hypokalemia noted.  Will replete.    Interval  History:  No acute issues overnight.  Patient is still reports some abdominal pain although slightly improved.      Review of Systems   Constitutional:  Negative for fatigue and fever.   HENT:  Negative for sinus pressure.    Eyes:  Negative for visual disturbance.   Respiratory:  Negative for shortness of breath.    Cardiovascular:  Negative for chest pain.   Gastrointestinal:  Positive for abdominal pain. Negative for nausea and vomiting.   Genitourinary:  Negative for difficulty urinating.   Musculoskeletal:  Negative for back pain.   Skin:  Negative for rash.   Neurological:  Negative for headaches.   Psychiatric/Behavioral:  Negative for confusion.      Objective:     Vital Signs (Most Recent):  Temp: 99.1 °F (37.3 °C) (06/04/25 0457)  Pulse: 71 (06/04/25 0725)  Resp: 16 (06/04/25 0853)  BP: 116/62 (06/04/25 0457)  SpO2: 99 % (06/04/25 0457) Vital Signs (24h Range):  Temp:  [97.6 °F (36.4 °C)-99.1 °F (37.3 °C)] 99.1 °F (37.3 °C)  Pulse:  [64-87] 71  Resp:  [16-26] 16  SpO2:  [96 %-100 %] 99 %  BP: (113-164)/(57-81) 116/62     Weight: 104.3 kg (230 lb)  Body mass index is 38.27 kg/m².  No intake or output data in the 24 hours ending 06/04/25 1015      Physical Exam  Constitutional:       General: She is not in acute distress.     Appearance: She is well-developed. She is obese. She is not diaphoretic.   HENT:      Head: Normocephalic and atraumatic.   Eyes:      Pupils: Pupils are equal, round, and reactive to light.   Cardiovascular:      Rate and Rhythm: Normal rate and regular rhythm.      Heart sounds: Normal heart sounds. No murmur heard.     No friction rub. No gallop.   Pulmonary:      Effort: Pulmonary effort is normal. No respiratory distress.      Breath sounds: Normal breath sounds. No stridor. No wheezing or rales.   Abdominal:      General: Bowel sounds are normal. There is no distension.      Palpations: Abdomen is soft. There is no mass.      Tenderness: There is abdominal tenderness. There is no  guarding.   Skin:     General: Skin is warm.      Findings: No erythema.   Neurological:      Mental Status: She is alert and oriented to person, place, and time.               Significant Labs: All pertinent labs within the past 24 hours have been reviewed.    Significant Imaging: I have reviewed all pertinent imaging results/findings within the past 24 hours.      Assessment & Plan  Pain of upper abdomen  Mostly TTP to RUQ and epigastric area, she does have some mild TTP in LLQ  --CT final report is pending, prelim read showing gallstones, distended gallbladder with stone in gallbladder neck, as well as incidental finding of 14 mm right renal artery aneurysm, also suggestive of colitis  -- surgery consulted per ED, recommended US abdomen  -- consult vascular surgery for additional recommendations regarding right renal artery aneurysm  -- IV Zosyn for treatment of colitis  -- NPO  -- PRN pain medications ordered, PRN antiemetics ordered    6/4/2025  Pain appears to be slightly improved.    Continue empiric Zosyn.  Surgery on case   HIDA scan ordered   Patient may need GI consult      Cholelithiasis  General surgery consulted   NPO for now  US abdomen is pending    Colitis  IV zosyn  PRN pain medication ordered  Npo for now    Essential hypertension  Patient's blood pressure range in the last 24 hours was: BP  Min: 113/57  Max: 164/81.The patient's inpatient anti-hypertensive regimen is listed below:  Current Antihypertensives  hydrALAZINE injection 10 mg, Every 6 hours PRN, Intravenous    Plan  - BP is controlled, no changes needed to their regimen  - Pain control helped with BP, holding home HCTZ for now  Renal artery aneurysm  Consult vascular surgery    VTE Risk Mitigation (From admission, onward)           Ordered     Reason for No Pharmacological VTE Prophylaxis  Once        Comments: May need surgery   Question:  Reasons:  Answer:  Physician Provided (leave comment)    06/03/25 0741     IP VTE HIGH RISK  PATIENT  Once         06/03/25 0741     Place sequential compression device  Until discontinued         06/03/25 0741                    Discharge Planning   ALINA:      Code Status: Full Code   Medical Readiness for Discharge Date:                            Moody Lawson MD  Department of Hospital Medicine   [facility]

## 2025-06-05 ENCOUNTER — ANESTHESIA (OUTPATIENT)
Dept: SURGERY | Facility: HOSPITAL | Age: 40
End: 2025-06-05
Payer: MEDICAID

## 2025-06-05 ENCOUNTER — ANESTHESIA EVENT (OUTPATIENT)
Dept: SURGERY | Facility: HOSPITAL | Age: 40
End: 2025-06-05
Payer: MEDICAID

## 2025-06-05 PROBLEM — F17.200 TOBACCO DEPENDENCY: Status: ACTIVE | Noted: 2025-06-05

## 2025-06-05 LAB
ABSOLUTE EOSINOPHIL (OHS): 0.06 K/UL
ABSOLUTE MONOCYTE (OHS): 0.7 K/UL (ref 0.3–1)
ABSOLUTE NEUTROPHIL COUNT (OHS): 8.63 K/UL (ref 1.8–7.7)
ALBUMIN SERPL BCP-MCNC: 3.3 G/DL (ref 3.5–5.2)
ALP SERPL-CCNC: 90 UNIT/L (ref 40–150)
ALT SERPL W/O P-5'-P-CCNC: 22 UNIT/L (ref 10–44)
ANION GAP (OHS): 10 MMOL/L (ref 8–16)
AST SERPL-CCNC: 17 UNIT/L (ref 11–45)
BASOPHILS # BLD AUTO: 0.05 K/UL
BASOPHILS NFR BLD AUTO: 0.4 %
BILIRUB SERPL-MCNC: 0.4 MG/DL (ref 0.1–1)
BUN SERPL-MCNC: 5 MG/DL (ref 6–20)
CALCIUM SERPL-MCNC: 8.6 MG/DL (ref 8.7–10.5)
CHLORIDE SERPL-SCNC: 107 MMOL/L (ref 95–110)
CO2 SERPL-SCNC: 18 MMOL/L (ref 23–29)
CREAT SERPL-MCNC: 0.7 MG/DL (ref 0.5–1.4)
ERYTHROCYTE [DISTWIDTH] IN BLOOD BY AUTOMATED COUNT: 13.1 % (ref 11.5–14.5)
GFR SERPLBLD CREATININE-BSD FMLA CKD-EPI: >60 ML/MIN/1.73/M2
GLUCOSE SERPL-MCNC: 133 MG/DL (ref 70–110)
HCT VFR BLD AUTO: 38.3 % (ref 37–48.5)
HGB BLD-MCNC: 12.4 GM/DL (ref 12–16)
IMM GRANULOCYTES # BLD AUTO: 0.04 K/UL (ref 0–0.04)
IMM GRANULOCYTES NFR BLD AUTO: 0.4 % (ref 0–0.5)
LACTATE SERPL-SCNC: 1.1 MMOL/L (ref 0.5–2.2)
LYMPHOCYTES # BLD AUTO: 1.85 K/UL (ref 1–4.8)
MCH RBC QN AUTO: 29.7 PG (ref 27–31)
MCHC RBC AUTO-ENTMCNC: 32.4 G/DL (ref 32–36)
MCV RBC AUTO: 92 FL (ref 82–98)
NUCLEATED RBC (/100WBC) (OHS): 0 /100 WBC
PLATELET # BLD AUTO: 371 K/UL (ref 150–450)
PMV BLD AUTO: 9.8 FL (ref 9.2–12.9)
POTASSIUM SERPL-SCNC: 3.7 MMOL/L (ref 3.5–5.1)
PROT SERPL-MCNC: 7.8 GM/DL (ref 6–8.4)
RBC # BLD AUTO: 4.18 M/UL (ref 4–5.4)
RELATIVE EOSINOPHIL (OHS): 0.5 %
RELATIVE LYMPHOCYTE (OHS): 16.3 % (ref 18–48)
RELATIVE MONOCYTE (OHS): 6.2 % (ref 4–15)
RELATIVE NEUTROPHIL (OHS): 76.2 % (ref 38–73)
SODIUM SERPL-SCNC: 135 MMOL/L (ref 136–145)
WBC # BLD AUTO: 11.33 K/UL (ref 3.9–12.7)

## 2025-06-05 PROCEDURE — 94761 N-INVAS EAR/PLS OXIMETRY MLT: CPT

## 2025-06-05 PROCEDURE — 8E0W4CZ ROBOTIC ASSISTED PROCEDURE OF TRUNK REGION, PERCUTANEOUS ENDOSCOPIC APPROACH: ICD-10-PCS | Performed by: COLON & RECTAL SURGERY

## 2025-06-05 PROCEDURE — 25000003 PHARM REV CODE 250: Performed by: FAMILY MEDICINE

## 2025-06-05 PROCEDURE — 36000710: Performed by: COLON & RECTAL SURGERY

## 2025-06-05 PROCEDURE — 83993 ASSAY FOR CALPROTECTIN FECAL: CPT | Performed by: FAMILY MEDICINE

## 2025-06-05 PROCEDURE — 63600175 PHARM REV CODE 636 W HCPCS: Performed by: COLON & RECTAL SURGERY

## 2025-06-05 PROCEDURE — 63600175 PHARM REV CODE 636 W HCPCS: Performed by: ANESTHESIOLOGY

## 2025-06-05 PROCEDURE — 25000003 PHARM REV CODE 250: Performed by: COLON & RECTAL SURGERY

## 2025-06-05 PROCEDURE — 37000008 HC ANESTHESIA 1ST 15 MINUTES: Performed by: COLON & RECTAL SURGERY

## 2025-06-05 PROCEDURE — 71000033 HC RECOVERY, INTIAL HOUR: Performed by: COLON & RECTAL SURGERY

## 2025-06-05 PROCEDURE — 36000711: Performed by: COLON & RECTAL SURGERY

## 2025-06-05 PROCEDURE — 88304 TISSUE EXAM BY PATHOLOGIST: CPT | Mod: TC | Performed by: COLON & RECTAL SURGERY

## 2025-06-05 PROCEDURE — 85025 COMPLETE CBC W/AUTO DIFF WBC: CPT | Performed by: NURSE PRACTITIONER

## 2025-06-05 PROCEDURE — 80053 COMPREHEN METABOLIC PANEL: CPT | Performed by: NURSE PRACTITIONER

## 2025-06-05 PROCEDURE — 37000009 HC ANESTHESIA EA ADD 15 MINS: Performed by: COLON & RECTAL SURGERY

## 2025-06-05 PROCEDURE — 25000003 PHARM REV CODE 250

## 2025-06-05 PROCEDURE — 36415 COLL VENOUS BLD VENIPUNCTURE: CPT | Performed by: NURSE PRACTITIONER

## 2025-06-05 PROCEDURE — 27201423 OPTIME MED/SURG SUP & DEVICES STERILE SUPPLY: Performed by: COLON & RECTAL SURGERY

## 2025-06-05 PROCEDURE — 36415 COLL VENOUS BLD VENIPUNCTURE: CPT | Performed by: FAMILY MEDICINE

## 2025-06-05 PROCEDURE — 47562 LAPAROSCOPIC CHOLECYSTECTOMY: CPT | Mod: ,,, | Performed by: COLON & RECTAL SURGERY

## 2025-06-05 PROCEDURE — 83605 ASSAY OF LACTIC ACID: CPT | Performed by: FAMILY MEDICINE

## 2025-06-05 PROCEDURE — 63600175 PHARM REV CODE 636 W HCPCS: Performed by: STUDENT IN AN ORGANIZED HEALTH CARE EDUCATION/TRAINING PROGRAM

## 2025-06-05 PROCEDURE — 11000001 HC ACUTE MED/SURG PRIVATE ROOM

## 2025-06-05 PROCEDURE — 0FT44ZZ RESECTION OF GALLBLADDER, PERCUTANEOUS ENDOSCOPIC APPROACH: ICD-10-PCS | Performed by: COLON & RECTAL SURGERY

## 2025-06-05 PROCEDURE — 63600175 PHARM REV CODE 636 W HCPCS: Performed by: FAMILY MEDICINE

## 2025-06-05 PROCEDURE — 63600175 PHARM REV CODE 636 W HCPCS

## 2025-06-05 PROCEDURE — 25000003 PHARM REV CODE 250: Performed by: NURSE PRACTITIONER

## 2025-06-05 PROCEDURE — 99900035 HC TECH TIME PER 15 MIN (STAT)

## 2025-06-05 PROCEDURE — 63600175 PHARM REV CODE 636 W HCPCS: Performed by: NURSE PRACTITIONER

## 2025-06-05 RX ORDER — KETOROLAC TROMETHAMINE 30 MG/ML
15 INJECTION, SOLUTION INTRAMUSCULAR; INTRAVENOUS ONCE
Status: COMPLETED | OUTPATIENT
Start: 2025-06-05 | End: 2025-06-05

## 2025-06-05 RX ORDER — ONDANSETRON HYDROCHLORIDE 2 MG/ML
INJECTION, SOLUTION INTRAVENOUS
Status: DISCONTINUED | OUTPATIENT
Start: 2025-06-05 | End: 2025-06-05

## 2025-06-05 RX ORDER — ACETAMINOPHEN 10 MG/ML
INJECTION, SOLUTION INTRAVENOUS
Status: DISCONTINUED | OUTPATIENT
Start: 2025-06-05 | End: 2025-06-05

## 2025-06-05 RX ORDER — HYDROMORPHONE HYDROCHLORIDE 2 MG/ML
0.2 INJECTION, SOLUTION INTRAMUSCULAR; INTRAVENOUS; SUBCUTANEOUS EVERY 5 MIN PRN
Status: DISCONTINUED | OUTPATIENT
Start: 2025-06-05 | End: 2025-06-05 | Stop reason: HOSPADM

## 2025-06-05 RX ORDER — EPHEDRINE SULFATE 50 MG/ML
INJECTION, SOLUTION INTRAVENOUS
Status: DISCONTINUED | OUTPATIENT
Start: 2025-06-05 | End: 2025-06-05

## 2025-06-05 RX ORDER — ONDANSETRON HYDROCHLORIDE 2 MG/ML
4 INJECTION, SOLUTION INTRAVENOUS DAILY PRN
Status: DISCONTINUED | OUTPATIENT
Start: 2025-06-05 | End: 2025-06-05 | Stop reason: HOSPADM

## 2025-06-05 RX ORDER — MORPHINE SULFATE 4 MG/ML
2 INJECTION, SOLUTION INTRAMUSCULAR; INTRAVENOUS
Status: DISCONTINUED | OUTPATIENT
Start: 2025-06-05 | End: 2025-06-06 | Stop reason: HOSPADM

## 2025-06-05 RX ORDER — HYDROMORPHONE HYDROCHLORIDE 1 MG/ML
1 INJECTION, SOLUTION INTRAMUSCULAR; INTRAVENOUS; SUBCUTANEOUS ONCE
Refills: 0 | Status: COMPLETED | OUTPATIENT
Start: 2025-06-05 | End: 2025-06-05

## 2025-06-05 RX ORDER — CEFAZOLIN SODIUM 1 G/3ML
INJECTION, POWDER, FOR SOLUTION INTRAMUSCULAR; INTRAVENOUS
Status: DISCONTINUED | OUTPATIENT
Start: 2025-06-05 | End: 2025-06-05

## 2025-06-05 RX ORDER — BUPIVACAINE HYDROCHLORIDE 2.5 MG/ML
INJECTION, SOLUTION EPIDURAL; INFILTRATION; INTRACAUDAL; PERINEURAL
Status: DISCONTINUED | OUTPATIENT
Start: 2025-06-05 | End: 2025-06-05 | Stop reason: HOSPADM

## 2025-06-05 RX ORDER — PROPOFOL 10 MG/ML
VIAL (ML) INTRAVENOUS
Status: DISCONTINUED | OUTPATIENT
Start: 2025-06-05 | End: 2025-06-05

## 2025-06-05 RX ORDER — LIDOCAINE HYDROCHLORIDE 20 MG/ML
INJECTION INTRAVENOUS
Status: DISCONTINUED | OUTPATIENT
Start: 2025-06-05 | End: 2025-06-05

## 2025-06-05 RX ORDER — OXYCODONE HYDROCHLORIDE 5 MG/1
10 TABLET ORAL EVERY 4 HOURS PRN
Status: DISCONTINUED | OUTPATIENT
Start: 2025-06-05 | End: 2025-06-06 | Stop reason: HOSPADM

## 2025-06-05 RX ORDER — ROCURONIUM BROMIDE 10 MG/ML
INJECTION, SOLUTION INTRAVENOUS
Status: DISCONTINUED | OUTPATIENT
Start: 2025-06-05 | End: 2025-06-05

## 2025-06-05 RX ORDER — DICYCLOMINE HYDROCHLORIDE 20 MG/1
20 TABLET ORAL ONCE
Status: COMPLETED | OUTPATIENT
Start: 2025-06-05 | End: 2025-06-05

## 2025-06-05 RX ORDER — DEXAMETHASONE SODIUM PHOSPHATE 4 MG/ML
INJECTION, SOLUTION INTRA-ARTICULAR; INTRALESIONAL; INTRAMUSCULAR; INTRAVENOUS; SOFT TISSUE
Status: DISCONTINUED | OUTPATIENT
Start: 2025-06-05 | End: 2025-06-05

## 2025-06-05 RX ORDER — DEXMEDETOMIDINE HYDROCHLORIDE 100 UG/ML
INJECTION, SOLUTION INTRAVENOUS
Status: DISCONTINUED | OUTPATIENT
Start: 2025-06-05 | End: 2025-06-05

## 2025-06-05 RX ORDER — OXYCODONE AND ACETAMINOPHEN 5; 325 MG/1; MG/1
1 TABLET ORAL
Status: DISCONTINUED | OUTPATIENT
Start: 2025-06-05 | End: 2025-06-05 | Stop reason: HOSPADM

## 2025-06-05 RX ORDER — KETOROLAC TROMETHAMINE 30 MG/ML
15 INJECTION, SOLUTION INTRAMUSCULAR; INTRAVENOUS EVERY 8 HOURS PRN
Status: DISCONTINUED | OUTPATIENT
Start: 2025-06-05 | End: 2025-06-05 | Stop reason: HOSPADM

## 2025-06-05 RX ORDER — METHOCARBAMOL 100 MG/ML
500 INJECTION, SOLUTION INTRAMUSCULAR; INTRAVENOUS EVERY 8 HOURS
Status: DISPENSED | OUTPATIENT
Start: 2025-06-05 | End: 2025-06-06

## 2025-06-05 RX ORDER — OXYCODONE HYDROCHLORIDE 5 MG/1
5 TABLET ORAL EVERY 4 HOURS PRN
Status: DISCONTINUED | OUTPATIENT
Start: 2025-06-05 | End: 2025-06-06 | Stop reason: HOSPADM

## 2025-06-05 RX ORDER — PHENYLEPHRINE HYDROCHLORIDE 10 MG/ML
INJECTION INTRAVENOUS
Status: DISCONTINUED | OUTPATIENT
Start: 2025-06-05 | End: 2025-06-05

## 2025-06-05 RX ORDER — CEFTRIAXONE 2 G/1
2 INJECTION, POWDER, FOR SOLUTION INTRAMUSCULAR; INTRAVENOUS
Status: DISCONTINUED | OUTPATIENT
Start: 2025-06-05 | End: 2025-06-06 | Stop reason: HOSPADM

## 2025-06-05 RX ORDER — FENTANYL CITRATE 50 UG/ML
INJECTION, SOLUTION INTRAMUSCULAR; INTRAVENOUS
Status: DISCONTINUED | OUTPATIENT
Start: 2025-06-05 | End: 2025-06-05

## 2025-06-05 RX ORDER — INDOCYANINE GREEN AND WATER 25 MG
KIT INJECTION
Status: DISCONTINUED | OUTPATIENT
Start: 2025-06-05 | End: 2025-06-05

## 2025-06-05 RX ORDER — SUCCINYLCHOLINE CHLORIDE 20 MG/ML
INJECTION INTRAMUSCULAR; INTRAVENOUS
Status: DISCONTINUED | OUTPATIENT
Start: 2025-06-05 | End: 2025-06-05

## 2025-06-05 RX ORDER — MIDAZOLAM HYDROCHLORIDE 1 MG/ML
INJECTION INTRAMUSCULAR; INTRAVENOUS
Status: DISCONTINUED | OUTPATIENT
Start: 2025-06-05 | End: 2025-06-05

## 2025-06-05 RX ADMIN — CEFAZOLIN 2 G: 330 INJECTION, POWDER, FOR SOLUTION INTRAMUSCULAR; INTRAVENOUS at 02:06

## 2025-06-05 RX ADMIN — HYDROMORPHONE HYDROCHLORIDE 1 MG: 1 INJECTION, SOLUTION INTRAMUSCULAR; INTRAVENOUS; SUBCUTANEOUS at 12:06

## 2025-06-05 RX ADMIN — EPHEDRINE SULFATE 20 MG: 50 INJECTION INTRAVENOUS at 03:06

## 2025-06-05 RX ADMIN — CEFTRIAXONE SODIUM 2 G: 2 INJECTION, POWDER, FOR SOLUTION INTRAMUSCULAR; INTRAVENOUS at 11:06

## 2025-06-05 RX ADMIN — ONDANSETRON 4 MG: 2 INJECTION INTRAMUSCULAR; INTRAVENOUS at 05:06

## 2025-06-05 RX ADMIN — HYDROMORPHONE HYDROCHLORIDE 0.2 MG: 2 INJECTION, SOLUTION INTRAMUSCULAR; INTRAVENOUS; SUBCUTANEOUS at 05:06

## 2025-06-05 RX ADMIN — ACETAMINOPHEN 1000 MG: 10 INJECTION, SOLUTION INTRAVENOUS at 04:06

## 2025-06-05 RX ADMIN — ONDANSETRON 4 MG: 2 INJECTION INTRAMUSCULAR; INTRAVENOUS at 04:06

## 2025-06-05 RX ADMIN — FENTANYL CITRATE 50 MCG: 50 INJECTION, SOLUTION INTRAMUSCULAR; INTRAVENOUS at 04:06

## 2025-06-05 RX ADMIN — ROCURONIUM BROMIDE 45 MG: 10 SOLUTION INTRAVENOUS at 02:06

## 2025-06-05 RX ADMIN — KETOROLAC TROMETHAMINE 15 MG: 30 INJECTION, SOLUTION INTRAMUSCULAR; INTRAVENOUS at 08:06

## 2025-06-05 RX ADMIN — INDOCYANINE GREEN AND WATER 2.5 MG: KIT at 02:06

## 2025-06-05 RX ADMIN — HYDROMORPHONE HYDROCHLORIDE 0.2 MG: 2 INJECTION, SOLUTION INTRAMUSCULAR; INTRAVENOUS; SUBCUTANEOUS at 04:06

## 2025-06-05 RX ADMIN — LIDOCAINE HYDROCHLORIDE 100 MG: 20 INJECTION INTRAVENOUS at 02:06

## 2025-06-05 RX ADMIN — PROPOFOL 150 MG: 10 INJECTION, EMULSION INTRAVENOUS at 02:06

## 2025-06-05 RX ADMIN — MIDAZOLAM HYDROCHLORIDE 2 MG: 1 INJECTION, SOLUTION INTRAMUSCULAR; INTRAVENOUS at 02:06

## 2025-06-05 RX ADMIN — HYDROCHLOROTHIAZIDE 12.5 MG: 12.5 TABLET ORAL at 08:06

## 2025-06-05 RX ADMIN — METHOCARBAMOL 500 MG: 100 INJECTION INTRAMUSCULAR; INTRAVENOUS at 09:06

## 2025-06-05 RX ADMIN — PHENYLEPHRINE HYDROCHLORIDE 100 MCG: 10 INJECTION INTRAVENOUS at 03:06

## 2025-06-05 RX ADMIN — SODIUM CHLORIDE: 9 INJECTION, SOLUTION INTRAVENOUS at 04:06

## 2025-06-05 RX ADMIN — SUCCINYLCHOLINE CHLORIDE 140 MG: 20 INJECTION, SOLUTION INTRAMUSCULAR; INTRAVENOUS; PARENTERAL at 02:06

## 2025-06-05 RX ADMIN — ROCURONIUM BROMIDE 10 MG: 10 SOLUTION INTRAVENOUS at 03:06

## 2025-06-05 RX ADMIN — SODIUM CHLORIDE: 9 INJECTION, SOLUTION INTRAVENOUS at 05:06

## 2025-06-05 RX ADMIN — SUGAMMADEX 200 MG: 100 INJECTION, SOLUTION INTRAVENOUS at 04:06

## 2025-06-05 RX ADMIN — HYDROMORPHONE HYDROCHLORIDE 0.5 MG: 1 INJECTION, SOLUTION INTRAMUSCULAR; INTRAVENOUS; SUBCUTANEOUS at 03:06

## 2025-06-05 RX ADMIN — ROCURONIUM BROMIDE 5 MG: 10 SOLUTION INTRAVENOUS at 02:06

## 2025-06-05 RX ADMIN — OXYCODONE 10 MG: 5 TABLET ORAL at 07:06

## 2025-06-05 RX ADMIN — PIPERACILLIN AND TAZOBACTAM 4.5 G: 4; .5 INJECTION, POWDER, LYOPHILIZED, FOR SOLUTION INTRAVENOUS; PARENTERAL at 04:06

## 2025-06-05 RX ADMIN — FENTANYL CITRATE 100 MCG: 50 INJECTION, SOLUTION INTRAMUSCULAR; INTRAVENOUS at 02:06

## 2025-06-05 RX ADMIN — HYDROMORPHONE HYDROCHLORIDE 0.5 MG: 1 INJECTION, SOLUTION INTRAMUSCULAR; INTRAVENOUS; SUBCUTANEOUS at 01:06

## 2025-06-05 RX ADMIN — DEXMEDETOMIDINE 10 MCG: 200 INJECTION, SOLUTION INTRAVENOUS at 02:06

## 2025-06-05 RX ADMIN — PHENYLEPHRINE HYDROCHLORIDE 200 MCG: 10 INJECTION INTRAVENOUS at 03:06

## 2025-06-05 RX ADMIN — DEXAMETHASONE SODIUM PHOSPHATE 4 MG: 4 INJECTION, SOLUTION INTRA-ARTICULAR; INTRALESIONAL; INTRAMUSCULAR; INTRAVENOUS; SOFT TISSUE at 02:06

## 2025-06-05 RX ADMIN — SODIUM CHLORIDE, POTASSIUM CHLORIDE, SODIUM LACTATE AND CALCIUM CHLORIDE: 600; 310; 30; 20 INJECTION, SOLUTION INTRAVENOUS at 02:06

## 2025-06-05 RX ADMIN — DICYCLOMINE HYDROCHLORIDE 20 MG: 20 TABLET ORAL at 07:06

## 2025-06-05 RX ADMIN — DEXMEDETOMIDINE 10 MCG: 200 INJECTION, SOLUTION INTRAVENOUS at 03:06

## 2025-06-05 RX ADMIN — HYDROMORPHONE HYDROCHLORIDE 0.5 MG: 1 INJECTION, SOLUTION INTRAMUSCULAR; INTRAVENOUS; SUBCUTANEOUS at 07:06

## 2025-06-05 RX ADMIN — SODIUM CHLORIDE, POTASSIUM CHLORIDE, SODIUM LACTATE AND CALCIUM CHLORIDE: 600; 310; 30; 20 INJECTION, SOLUTION INTRAVENOUS at 03:06

## 2025-06-05 RX ADMIN — ROCURONIUM BROMIDE 20 MG: 10 SOLUTION INTRAVENOUS at 03:06

## 2025-06-05 NOTE — SUBJECTIVE & OBJECTIVE
Interval History: AFVSS.  Increasing pain overnight diffusely throughout the abdomen.      Medications:  Continuous Infusions:   0.9% NaCl   Intravenous Continuous 150 mL/hr at 06/05/25 0402 New Bag at 06/05/25 0402     Scheduled Meds:   cefTRIAXone (Rocephin) IV (PEDS and ADULTS)  2 g Intravenous Q24H    hydroCHLOROthiazide  12.5 mg Oral Daily    methocarbamol injection  500 mg Intravenous Q8H     PRN Meds:  Current Facility-Administered Medications:     acetaminophen, 650 mg, Oral, Q4H PRN    bisacodyL, 10 mg, Rectal, Daily PRN    dextrose 50%, 12.5 g, Intravenous, PRN    dextrose 50%, 25 g, Intravenous, PRN    glucagon (human recombinant), 1 mg, Intramuscular, PRN    glucose, 16 g, Oral, PRN    glucose, 24 g, Oral, PRN    hydrALAZINE, 10 mg, Intravenous, Q6H PRN    HYDROmorphone, 0.5 mg, Intravenous, Q4H PRN    melatonin, 6 mg, Oral, Nightly PRN    naloxone, 0.02 mg, Intravenous, PRN    ondansetron, 4 mg, Intravenous, Q6H PRN    sodium chloride 0.9%, 3 mL, Intravenous, Q8H PRN     Review of patient's allergies indicates:  No Known Allergies  Objective:     Vital Signs (Most Recent):  Temp: 98 °F (36.7 °C) (06/05/25 0814)  Pulse: 74 (06/05/25 0814)  Resp: 18 (06/05/25 0754)  BP: (!) 184/86 (06/05/25 0814)  SpO2: 98 % (06/05/25 0814) Vital Signs (24h Range):  Temp:  [98 °F (36.7 °C)-99.2 °F (37.3 °C)] 98 °F (36.7 °C)  Pulse:  [61-88] 74  Resp:  [18] 18  SpO2:  [96 %-100 %] 98 %  BP: (132-184)/(75-91) 184/86     Weight: 106.7 kg (235 lb 3.7 oz)  Body mass index is 39.14 kg/m².    Intake/Output - Last 3 Shifts         06/03 0700 06/04 0659 06/04 0700 06/05 0659 06/05 0700 06/06 0659    IV Piggyback       Total Intake(mL/kg)       Net              Unmeasured Stool Occurrence 1 x 1 x              Physical Exam  Constitutional:       General: She is in acute distress.      Appearance: Normal appearance. She is not ill-appearing.   HENT:      Head: Normocephalic and atraumatic.   Eyes:      Extraocular Movements:  Extraocular movements intact.      Conjunctiva/sclera: Conjunctivae normal.   Cardiovascular:      Rate and Rhythm: Normal rate and regular rhythm.   Pulmonary:      Effort: Pulmonary effort is normal.   Abdominal:      General: Abdomen is flat. There is no distension.      Palpations: Abdomen is soft. There is no mass.      Tenderness: There is abdominal tenderness. There is no guarding or rebound.      Hernia: No hernia is present.   Musculoskeletal:         General: No swelling, tenderness, deformity or signs of injury. Normal range of motion.   Skin:     General: Skin is warm and dry.      Coloration: Skin is not jaundiced.   Neurological:      General: No focal deficit present.      Mental Status: She is alert and oriented to person, place, and time.   Psychiatric:         Mood and Affect: Mood normal.         Behavior: Behavior normal.         Thought Content: Thought content normal.          Significant Labs:  I have reviewed all pertinent lab results within the past 24 hours.  CBC:   Recent Labs   Lab 06/05/25  0427   WBC 11.33   RBC 4.18   HGB 12.4   HCT 38.3      MCV 92   MCH 29.7   MCHC 32.4     BMP:   Recent Labs   Lab 06/04/25  1101 06/05/25  0427   GLU  --  133*   NA  --  135*   K  --  3.7   CL  --  107   CO2  --  18*   BUN  --  5*   CREATININE  --  0.7   CALCIUM  --  8.6*   MG 1.9  --        Significant Diagnostics:  I have reviewed all pertinent imaging results/findings within the past 24 hours.

## 2025-06-05 NOTE — ASSESSMENT & PLAN NOTE
Patient's blood pressure range in the last 24 hours was: BP  Min: 132/76  Max: 184/86.The patient's inpatient anti-hypertensive regimen is listed below:  Current Antihypertensives  hydrALAZINE injection 10 mg, Every 6 hours PRN, Intravenous  hydroCHLOROthiazide tablet 12.5 mg, Daily, Oral    Plan  - BP is controlled, no changes needed to their regimen  - Pain control helped with BP, holding home HCTZ for now

## 2025-06-05 NOTE — PROGRESS NOTES
[facility]  General Surgery  Progress Note    Subjective:     History of Present Illness:  Rhona Zaman is a 39 y.o. female as hypertension, and history of pulmonary embolism in 2020 (not on anticoagulation), and current smoking who presented to the ER last night with generalized abdominal pain as well as nausea, vomiting, constipation, and chest tightness.  She notes the pain is predominantly located in the left lower quadrant and up the left side of the abdomen, and states that she has been having this pain off and on for more than a year.  She notes that she had a hysterectomy about a year ago in attempts to deal with this pain however states the pain has only gotten worse.  In addition to her left lower quadrant pain which has improved with lying in the fetal position, she also does note some intermittent right upper quadrant and epigastric pain.  In addition she complains of nausea but without vomiting and decreased p.o. intake.  She is chronically constipated but did have some diarrhea last week.  She has never had a colonoscopy or an EGD.  In the ER she was afebrile, vitals were all within normal limits.  White count was mildly elevated at 13.65, the remainder of her labs were overall normal, including lipase and LFTs.  CT abdomen and pelvis was done with IV contrast which on initial report showed mild thickening of left colon and sigmoid concerning for colitis as well as stones and a distended gallbladder.  Subsequent right upper quadrant ultrasound today showed mild thickening of the wall of the gallbladder (up to 4mm) with several stones, but no pericholecystic fluid or sonographic Santiago sign.     Post-Op Info:  Procedure(s) (LRB):  XI ROBOTIC CHOLECYSTECTOMY (N/A)         Interval History: AFVSS.  Increasing pain overnight diffusely throughout the abdomen.      Medications:  Continuous Infusions:   0.9% NaCl   Intravenous Continuous 150 mL/hr at 06/05/25 0402 New Bag at 06/05/25 0402      Scheduled Meds:   cefTRIAXone (Rocephin) IV (PEDS and ADULTS)  2 g Intravenous Q24H    hydroCHLOROthiazide  12.5 mg Oral Daily    methocarbamol injection  500 mg Intravenous Q8H     PRN Meds:  Current Facility-Administered Medications:     acetaminophen, 650 mg, Oral, Q4H PRN    bisacodyL, 10 mg, Rectal, Daily PRN    dextrose 50%, 12.5 g, Intravenous, PRN    dextrose 50%, 25 g, Intravenous, PRN    glucagon (human recombinant), 1 mg, Intramuscular, PRN    glucose, 16 g, Oral, PRN    glucose, 24 g, Oral, PRN    hydrALAZINE, 10 mg, Intravenous, Q6H PRN    HYDROmorphone, 0.5 mg, Intravenous, Q4H PRN    melatonin, 6 mg, Oral, Nightly PRN    naloxone, 0.02 mg, Intravenous, PRN    ondansetron, 4 mg, Intravenous, Q6H PRN    sodium chloride 0.9%, 3 mL, Intravenous, Q8H PRN     Review of patient's allergies indicates:  No Known Allergies  Objective:     Vital Signs (Most Recent):  Temp: 98 °F (36.7 °C) (06/05/25 0814)  Pulse: 74 (06/05/25 0814)  Resp: 18 (06/05/25 0754)  BP: (!) 184/86 (06/05/25 0814)  SpO2: 98 % (06/05/25 0814) Vital Signs (24h Range):  Temp:  [98 °F (36.7 °C)-99.2 °F (37.3 °C)] 98 °F (36.7 °C)  Pulse:  [61-88] 74  Resp:  [18] 18  SpO2:  [96 %-100 %] 98 %  BP: (132-184)/(75-91) 184/86     Weight: 106.7 kg (235 lb 3.7 oz)  Body mass index is 39.14 kg/m².    Intake/Output - Last 3 Shifts         06/03 0700  06/04 0659 06/04 0700  06/05 0659 06/05 0700  06/06 0659    IV Piggyback       Total Intake(mL/kg)       Net              Unmeasured Stool Occurrence 1 x 1 x              Physical Exam  Constitutional:       General: She is in acute distress.      Appearance: Normal appearance. She is not ill-appearing.   HENT:      Head: Normocephalic and atraumatic.   Eyes:      Extraocular Movements: Extraocular movements intact.      Conjunctiva/sclera: Conjunctivae normal.   Cardiovascular:      Rate and Rhythm: Normal rate and regular rhythm.   Pulmonary:      Effort: Pulmonary effort is normal.   Abdominal:       General: Abdomen is flat. There is no distension.      Palpations: Abdomen is soft. There is no mass.      Tenderness: There is abdominal tenderness. There is no guarding or rebound.      Hernia: No hernia is present.   Musculoskeletal:         General: No swelling, tenderness, deformity or signs of injury. Normal range of motion.   Skin:     General: Skin is warm and dry.      Coloration: Skin is not jaundiced.   Neurological:      General: No focal deficit present.      Mental Status: She is alert and oriented to person, place, and time.   Psychiatric:         Mood and Affect: Mood normal.         Behavior: Behavior normal.         Thought Content: Thought content normal.          Significant Labs:  I have reviewed all pertinent lab results within the past 24 hours.  CBC:   Recent Labs   Lab 06/05/25 0427   WBC 11.33   RBC 4.18   HGB 12.4   HCT 38.3      MCV 92   MCH 29.7   MCHC 32.4     BMP:   Recent Labs   Lab 06/04/25  1101 06/05/25  0427   GLU  --  133*   NA  --  135*   K  --  3.7   CL  --  107   CO2  --  18*   BUN  --  5*   CREATININE  --  0.7   CALCIUM  --  8.6*   MG 1.9  --        Significant Diagnostics:  I have reviewed all pertinent imaging results/findings within the past 24 hours.  Assessment/Plan:     * Pain of upper abdomen  Positive HIDA.  Worsening pain overnight.  Needs cholecystectomy.  Will be done by one of my partners today. Consent to be obtained by my partner.     -- plan for cholecystectomy this admission   -- NPO today   -- remainder of care as per primary team     Renal artery aneurysm  The management as per primary team and vascular    Essential hypertension  Management as per primary team    Colitis  -- Recommend GI eval for underlying IBD in the setting of chronic lower abdominal pain     Cholelithiasis  Plan for cholecystectomy tomorrow or Friday depending on schedules.         Lakeisha Styles MD  General Surgery  [facility]      ADDENDUM:  Patient independently seen  and examined.  Still having global abdominal pain worse in the right upper quadrant.  Imaging and exam consistent with acute cholecystitis.  Plan for robotic/laparoscopic versus open cholecystectomy today.  - All risks, benefits and alternatives fully explained to patient. Risks include, but are not limited to, bleeding, infection, common bile duct injury, cystic duct stump leak, damage to other intra-abdominal organs such as colon, rectum, small bowel, stomach, liver, bladder, reproductive organs, sexual dysfunction, urinary dysfunction, postoperative abscess, conversion to open operation, perioperative MI, CVA and death.  All questions field and appropriately answered to patient's satisfaction.  Consent signed and placed on chart.

## 2025-06-05 NOTE — OP NOTE
[facility] - Surgery ([facility])  Surgery Department  Operative Note    SUMMARY     Date of Procedure: 6/5/2025     Procedure:  Robotic cholecystectomy  Transversus abdominis plane block    Surgeons and Role:     * Benson Martinez MD - Primary    Assisting Surgeon: None    Pre-Operative Diagnosis: Cholecystitis [K81.9]    Post-Operative Diagnosis: Post-Op Diagnosis Codes:     * Cholecystitis [K81.9]    Anesthesia: General    Indications for Procedure:  39-year-old female with acute cholecystitis who presents for definitive surgical management    Findings of the Procedure:  Acutely inflamed gallbladder with gallstones amenable to robotic cholecystectomy    Description of the Procedure:  Patient was brought to the operating room and placed supine on table.  General endotracheal anesthesia was then induced.  The abdomen was then prepped and draped in usual sterile fashion.  A preoperative surgical time-out was performed confirming the correct patient, procedure and preop medications given.  A left upper quadrant stab incision was made and the Veress needle was inserted into abdominal cavity and confirmed to be in good position with aspiration and saline drop test.  The abdomen was insufflated to a pressure 15 mm of mercury.  An 8 mm incision was then made in the supraumbilical position.  An 8 mm robotic trocar was inserted through this defect using Optiview technique.  The camera was introduced and there was no signs of injury upon entry.  A transversus abdominis plane block was then performed using 30 cc of 0.25% bupivacaine plain by injecting 15 cc bilaterally into the transversus abdominis plane.  Three additional 8 mm trocar was inserted with 1 in the left and 2 on the right and linear fashion.  The robot was then docked in usual fashion.    Attention was turned to the gallbladder.  The gallbladder was found to be extremely edematous and tense but was able to be retracted above the liver and cephalad.  This exposed  the infundibulum of the gallbladder which was retracted towards the right lower quadrant.  The peritoneum overlying the gallbladder was then sharply incised.  The triangle of Calot was then exposed and the cystic duct and artery were dissected circumferentially.  The critical view of safety was obtained.  IC green was given by anesthesia at the beginning of the case and firefly technology was used to confirm the location of the common bile duct away from the area of dissection.  The critical view of safety was then re-obtained.  The cystic duct was then doubly clipped away from the gallbladder and singly clipped towards the gallbladder.  It was then taken between the clips using robotic cassius.  The cystic artery was then taken in a similar fashion.  Once these were taken, it was confirmed there was only 1 lumen at each side and there was no extravasation of blood or bile.  The gallbladder was then dissected off the gallbladder fossa using electrocautery in usual fashion without injury to the gallbladder were spillage of stones or bile.  Once it was fully removed, the gallbladder fossa was checked and found to be hemostatic.  The clips were checked and found to be on the stumps and in place.  Was a small rent made just medial and superior to the gallbladder fossa in the liver parenchyma due to retraction and a piece of Surgicel was placed within this area for additional hemostasis.  The area was hemostatic after placing the Surgicel.    The gallbladder was then placed within Endo-Catch bag and the robot was de docked.  The right most lateral incision was then slightly enlarged due to the thickened nature of the gallbladder and the large stone within it.  It was then extracted under direct visualization after spreading the fascia bluntly.  Once the gallbladder and gallstone were removed they were passed off the field for final pathology.  The extraction site was then closed at the fascial level using a  Evans-Abbie device with 0 Vicryl suture in a figure-of-eight fashion.  Once this was completed, the abdomen was checked and found to be hemostatic.  The abdomen was then desufflated and the remaining ports were removed.  All sites were copiously irrigated made hemostatic.  All sites were closed with 4-0 Monocryl suture.  Skin glue was then applied.  The patient was then awoken from general endotracheal anesthesia and taken to the postanesthesia care in stable condition.  She tolerated procedure well.  All sponge, needle and instrument counts were correct at the end of the case.    Significant Surgical Tasks Conducted by the Assistant(s), if Applicable: n/a    Complications: No    Estimated Blood Loss (EBL): 10mL           Implants: * No implants in log *    Specimens:   Specimen (24h ago, onward)       Start     Ordered    06/05/25 8783  Specimen to Pathology General Surgery  RELEASE UPON ORDERING        References:    Click here for ordering Quick Tip   Question:  Release to patient  Answer:  Immediate    06/05/25 8896                            Condition: Good    Disposition: PACU - hemodynamically stable.    Attestation: I performed the procedure.

## 2025-06-05 NOTE — ASSESSMENT & PLAN NOTE
Positive HIDA.  Worsening pain overnight.  Needs cholecystectomy.  Will be done by one of my partners today. Consent to be obtained by my partner.     -- plan for cholecystectomy this admission   -- NPO today   -- remainder of care as per primary team

## 2025-06-05 NOTE — ASSESSMENT & PLAN NOTE
General surgery consulted   NPO for now  Hida positive for acute cholecystitis  Pending surgical intervention

## 2025-06-05 NOTE — SUBJECTIVE & OBJECTIVE
Interval History: See hospital course for today      Review of Systems   Constitutional:  Positive for activity change and appetite change. Negative for fever.   Respiratory:  Negative for shortness of breath.    Gastrointestinal:  Positive for abdominal pain.   Psychiatric/Behavioral:  The patient is nervous/anxious.      Objective:     Vital Signs (Most Recent):  Temp: 98.5 °F (36.9 °C) (06/05/25 1208)  Pulse: 77 (06/05/25 1208)  Resp: 18 (06/05/25 1344)  BP: (!) 161/79 (06/05/25 1208)  SpO2: 98 % (06/05/25 1208) Vital Signs (24h Range):  Temp:  [98 °F (36.7 °C)-99.2 °F (37.3 °C)] 98.5 °F (36.9 °C)  Pulse:  [61-82] 77  Resp:  [18] 18  SpO2:  [96 %-100 %] 98 %  BP: (132-184)/(75-91) 161/79     Weight: 106.7 kg (235 lb 3.7 oz)  Body mass index is 39.14 kg/m².  No intake or output data in the 24 hours ending 06/05/25 1440      Physical Exam  Vitals and nursing note reviewed. Exam conducted with a chaperone present (surgery, visitor).   Constitutional:       General: She is not in acute distress.     Appearance: She is obese. She is ill-appearing. She is not toxic-appearing.   HENT:      Head: Normocephalic and atraumatic.   Cardiovascular:      Rate and Rhythm: Normal rate.   Pulmonary:      Effort: Pulmonary effort is normal. No respiratory distress.   Abdominal:      Palpations: Abdomen is soft.      Tenderness: There is abdominal tenderness in the right upper quadrant and left lower quadrant.   Musculoskeletal:      Right lower leg: No edema.      Left lower leg: No edema.   Skin:     General: Skin is warm.   Neurological:      Mental Status: She is alert and oriented to person, place, and time.      Motor: Weakness present.   Psychiatric:         Mood and Affect: Mood is anxious.               Significant Labs: All pertinent labs within the past 24 hours have been reviewed.  CBC:   Recent Labs   Lab 06/04/25  0324 06/05/25  0427   WBC 9.64 11.33   HGB 11.5* 12.4   HCT 35.1* 38.3    371     CMP:   Recent  Labs   Lab 06/04/25  0324 06/05/25  0427    135*   K 2.9* 3.7    107   CO2 21* 18*   GLU 98 133*   BUN 5* 5*   CREATININE 0.6 0.7   CALCIUM 7.5* 8.6*   PROT 6.5 7.8   ALBUMIN 2.8* 3.3*   BILITOT 0.5 0.4   ALKPHOS 81 90   AST 15 17   ALT 19 22   ANIONGAP 8 10     Lactic Acid:   Recent Labs   Lab 06/05/25  0835   LACTATE 1.1       Significant Imaging: I have reviewed all pertinent imaging results/findings within the past 24 hours.  Hida showing acute cholecystitis

## 2025-06-05 NOTE — PROGRESS NOTES
[facility] - Surgery ([facility])  [facility] Medicine  Progress Note    Patient Name: Rhona Zaman  MRN: [id number]  Patient Class: IP- Inpatient   Admission Date: 6/3/2025  Length of Stay: 0 days  Attending Physician: Francisco Monet MD  Primary Care Provider: Jany, Primary Doctor        Subjective     Principal Problem:Pain of upper abdomen        HPI:   Patient is a 39-year-old female with past medical history significant for hypertension, pulmonary embolism in 2020, and tobacco abuse who presented  to ED with complaint of generalized abdominal pain. She also reports nausea, vomiting, constipation, and chest tightness. She denies vomiting, dysuria, fever, chills, back pain, shortness of breath, productive cough, weakness, edema. on arrival to ED, temp 98.5°, heart rate 88, respirations 17, blood pressure 140/82, 98% SpO2 on room air. lab workup shows WBC 13.65, hemoglobin 12.6, hematocrit 38.1, platelets 363, sodium 135, potassium 3.4, chloride 104, CO2 17, BUN 8, creatinine 0.7, glucose 158, alk-phos 97, AST 26, ALT 23, lipase 18, negative covid 19 screening, negative influenza A/B screening, rapid HIV negative, UA  done with no signs of infection.  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 While in ED, she was given IV Zosyn, dilaudid, morphine, zofran, and phenergan. General surgery was consulted per ED. Hospital Medicine was consulted for admission due to abdominal pain. General surgery recommended US gallbladder, which is ordered, pending.    Overview/Hospital Course:  06/04/2025  Patient reports mild improvement in abdominal pain.  HIDA scan pending this a.m..  Continue empiric Zosyn.  Hypokalemia noted.  Will  replete.  6/5 awaiting lap andra per surgery. +anxiety. Complains of abdominal pain.    Interval History: See hospital course for today      Review of Systems   Constitutional:  Positive for activity change and appetite change. Negative for fever.   Respiratory:  Negative for shortness of breath.    Gastrointestinal:  Positive for abdominal pain.   Psychiatric/Behavioral:  The patient is nervous/anxious.      Objective:     Vital Signs (Most Recent):  Temp: 98.5 °F (36.9 °C) (06/05/25 1208)  Pulse: 77 (06/05/25 1208)  Resp: 18 (06/05/25 1344)  BP: (!) 161/79 (06/05/25 1208)  SpO2: 98 % (06/05/25 1208) Vital Signs (24h Range):  Temp:  [98 °F (36.7 °C)-99.2 °F (37.3 °C)] 98.5 °F (36.9 °C)  Pulse:  [61-82] 77  Resp:  [18] 18  SpO2:  [96 %-100 %] 98 %  BP: (132-184)/(75-91) 161/79     Weight: 106.7 kg (235 lb 3.7 oz)  Body mass index is 39.14 kg/m².  No intake or output data in the 24 hours ending 06/05/25 1440      Physical Exam  Vitals and nursing note reviewed. Exam conducted with a chaperone present (surgery, visitor).   Constitutional:       General: She is not in acute distress.     Appearance: She is obese. She is ill-appearing. She is not toxic-appearing.   HENT:      Head: Normocephalic and atraumatic.   Cardiovascular:      Rate and Rhythm: Normal rate.   Pulmonary:      Effort: Pulmonary effort is normal. No respiratory distress.   Abdominal:      Palpations: Abdomen is soft.      Tenderness: There is abdominal tenderness in the right upper quadrant and left lower quadrant.   Musculoskeletal:      Right lower leg: No edema.      Left lower leg: No edema.   Skin:     General: Skin is warm.   Neurological:      Mental Status: She is alert and oriented to person, place, and time.      Motor: Weakness present.   Psychiatric:         Mood and Affect: Mood is anxious.               Significant Labs: All pertinent labs within the past 24 hours have been reviewed.  CBC:   Recent Labs   Lab 06/04/25  0324  06/05/25  0427   WBC 9.64 11.33   HGB 11.5* 12.4   HCT 35.1* 38.3    371     CMP:   Recent Labs   Lab 06/04/25  0324 06/05/25  0427    135*   K 2.9* 3.7    107   CO2 21* 18*   GLU 98 133*   BUN 5* 5*   CREATININE 0.6 0.7   CALCIUM 7.5* 8.6*   PROT 6.5 7.8   ALBUMIN 2.8* 3.3*   BILITOT 0.5 0.4   ALKPHOS 81 90   AST 15 17   ALT 19 22   ANIONGAP 8 10     Lactic Acid:   Recent Labs   Lab 06/05/25  0835   LACTATE 1.1       Significant Imaging: I have reviewed all pertinent imaging results/findings within the past 24 hours.  Elda showing acute cholecystitis       Assessment & Plan  Pain of upper abdomen  Mostly TTP to RUQ and epigastric area, she does have some mild TTP in LLQ  --CT final report is pending, prelim read showing gallstones, distended gallbladder with stone in gallbladder neck, as well as incidental finding of 14 mm right renal artery aneurysm, also suggestive of colitis  -- surgery consulted per ED, recommended US abdomen  -- consult vascular surgery for additional recommendations regarding right renal artery aneurysm  -- IV Zosyn for treatment of colitis  -- NPO  -- PRN pain medications ordered, PRN antiemetics ordered    6/5/2025  Hida positive for acute cholecystitis   On intravenous antibiotic(s)  Npo  Pending surgical intervention            Cholelithiasis  General surgery consulted   NPO for now  Hida positive for acute cholecystitis  Pending surgical intervention     Colitis  IV antibiotic(s)   PRN pain medication ordered  Npo   Calprotectin pending      Essential hypertension  Patient's blood pressure range in the last 24 hours was: BP  Min: 132/76  Max: 184/86.The patient's inpatient anti-hypertensive regimen is listed below:  Current Antihypertensives  hydrALAZINE injection 10 mg, Every 6 hours PRN, Intravenous  hydroCHLOROthiazide tablet 12.5 mg, Daily, Oral    Plan  - BP is controlled, no changes needed to their regimen  - Pain control helped with BP, holding home HCTZ for  now  Renal artery aneurysm  Consult vascular surgery  Follow up outpatient     VTE Risk Mitigation (From admission, onward)           Ordered     Reason for No Pharmacological VTE Prophylaxis  Once        Comments: May need surgery   Question:  Reasons:  Answer:  Physician Provided (leave comment)    06/03/25 0741     IP VTE HIGH RISK PATIENT  Once         06/03/25 0741     Place sequential compression device  Until discontinued         06/03/25 0741                    Discharge Planning   ALINA: 6/8/2025     Code Status: Full Code   Medical Readiness for Discharge Date:   Discharge Plan A: Home with family                        Francisco Monet MD  Department of Hospital Medicine   [facility] - Surgery ([facility])

## 2025-06-05 NOTE — PLAN OF CARE
Ongoing (interventions implemented as appropriate)  Pt is alert and oriented.    VSS  Pt able to make needs known.  Pt remained afebrile throughout this shift.   Pt remained free of falls this shift.   Prn pain medication given.  Plan of care reviewed. Patient verbalizes understanding.   Pt moving/turing independent. Frequent weight shifting encouraged.  Bed low, side rails up x 2, wheels locked, call light in reach.   Hourly rounding completed.   Will continue to observe.

## 2025-06-05 NOTE — ASSESSMENT & PLAN NOTE
Mostly TTP to RUQ and epigastric area, she does have some mild TTP in LLQ  --CT final report is pending, prelim read showing gallstones, distended gallbladder with stone in gallbladder neck, as well as incidental finding of 14 mm right renal artery aneurysm, also suggestive of colitis  -- surgery consulted per ED, recommended US abdomen  -- consult vascular surgery for additional recommendations regarding right renal artery aneurysm  -- IV Zosyn for treatment of colitis  -- NPO  -- PRN pain medications ordered, PRN antiemetics ordered    6/5/2025  Hida positive for acute cholecystitis   On intravenous antibiotic(s)  Npo  Pending surgical intervention

## 2025-06-06 VITALS
RESPIRATION RATE: 16 BRPM | OXYGEN SATURATION: 99 % | DIASTOLIC BLOOD PRESSURE: 78 MMHG | HEIGHT: 65 IN | TEMPERATURE: 99 F | SYSTOLIC BLOOD PRESSURE: 129 MMHG | HEART RATE: 65 BPM | WEIGHT: 235.25 LBS | BODY MASS INDEX: 39.2 KG/M2

## 2025-06-06 LAB
ABSOLUTE NEUTROPHIL MANUAL (OHS): 10.2 K/UL
ALBUMIN SERPL BCP-MCNC: 3.2 G/DL (ref 3.5–5.2)
ALP SERPL-CCNC: 86 UNIT/L (ref 40–150)
ALT SERPL W/O P-5'-P-CCNC: 55 UNIT/L (ref 10–44)
ANION GAP (OHS): 13 MMOL/L (ref 8–16)
AST SERPL-CCNC: 50 UNIT/L (ref 11–45)
BILIRUB SERPL-MCNC: 0.3 MG/DL (ref 0.1–1)
BUN SERPL-MCNC: 5 MG/DL (ref 6–20)
CALCIUM SERPL-MCNC: 8.2 MG/DL (ref 8.7–10.5)
CALPROTECTIN INTERP (OHS): NORMAL
CALPROTECTIN STOOL (OHS): 44.5 ΜG/G
CHLORIDE SERPL-SCNC: 106 MMOL/L (ref 95–110)
CO2 SERPL-SCNC: 16 MMOL/L (ref 23–29)
CREAT SERPL-MCNC: 0.6 MG/DL (ref 0.5–1.4)
ERYTHROCYTE [DISTWIDTH] IN BLOOD BY AUTOMATED COUNT: 13.2 % (ref 11.5–14.5)
GFR SERPLBLD CREATININE-BSD FMLA CKD-EPI: >60 ML/MIN/1.73/M2
GLUCOSE SERPL-MCNC: 86 MG/DL (ref 70–110)
HCT VFR BLD AUTO: 39.2 % (ref 37–48.5)
HGB BLD-MCNC: 12.7 GM/DL (ref 12–16)
LYMPHOCYTES NFR BLD MANUAL: 13 % (ref 18–48)
MCH RBC QN AUTO: 29.7 PG (ref 27–31)
MCHC RBC AUTO-ENTMCNC: 32.4 G/DL (ref 32–36)
MCV RBC AUTO: 92 FL (ref 82–98)
MONOCYTES NFR BLD MANUAL: 5 % (ref 4–15)
NEUTROPHILS NFR BLD MANUAL: 82 % (ref 38–73)
NUCLEATED RBC (/100WBC) (OHS): 0 /100 WBC
PLATELET # BLD AUTO: 310 K/UL (ref 150–450)
PLATELET BLD QL SMEAR: ABNORMAL
PMV BLD AUTO: 10.3 FL (ref 9.2–12.9)
POTASSIUM SERPL-SCNC: 3.3 MMOL/L (ref 3.5–5.1)
PROT SERPL-MCNC: 7.5 GM/DL (ref 6–8.4)
RBC # BLD AUTO: 4.28 M/UL (ref 4–5.4)
SODIUM SERPL-SCNC: 135 MMOL/L (ref 136–145)
WBC # BLD AUTO: 12.4 K/UL (ref 3.9–12.7)

## 2025-06-06 PROCEDURE — 25000003 PHARM REV CODE 250: Performed by: COLON & RECTAL SURGERY

## 2025-06-06 PROCEDURE — 85027 COMPLETE CBC AUTOMATED: CPT | Performed by: COLON & RECTAL SURGERY

## 2025-06-06 PROCEDURE — 36415 COLL VENOUS BLD VENIPUNCTURE: CPT | Performed by: COLON & RECTAL SURGERY

## 2025-06-06 PROCEDURE — 99024 POSTOP FOLLOW-UP VISIT: CPT | Mod: ,,, | Performed by: COLON & RECTAL SURGERY

## 2025-06-06 PROCEDURE — 80053 COMPREHEN METABOLIC PANEL: CPT | Performed by: COLON & RECTAL SURGERY

## 2025-06-06 PROCEDURE — 63600175 PHARM REV CODE 636 W HCPCS: Performed by: COLON & RECTAL SURGERY

## 2025-06-06 PROCEDURE — 25000003 PHARM REV CODE 250: Performed by: FAMILY MEDICINE

## 2025-06-06 RX ORDER — NICOTINE 7MG/24HR
1 PATCH, TRANSDERMAL 24 HOURS TRANSDERMAL DAILY
Qty: 7 PATCH | Refills: 0 | Status: SHIPPED | OUTPATIENT
Start: 2025-06-06

## 2025-06-06 RX ORDER — OXYCODONE AND ACETAMINOPHEN 5; 325 MG/1; MG/1
1 TABLET ORAL EVERY 8 HOURS PRN
Qty: 10 EACH | Refills: 0 | Status: SHIPPED | OUTPATIENT
Start: 2025-06-06

## 2025-06-06 RX ORDER — POLYETHYLENE GLYCOL 3350 17 G/17G
17 POWDER, FOR SOLUTION ORAL DAILY
Status: DISCONTINUED | OUTPATIENT
Start: 2025-06-06 | End: 2025-06-06 | Stop reason: HOSPADM

## 2025-06-06 RX ORDER — AMOXICILLIN 250 MG
1 CAPSULE ORAL 2 TIMES DAILY
Status: DISCONTINUED | OUTPATIENT
Start: 2025-06-06 | End: 2025-06-06 | Stop reason: HOSPADM

## 2025-06-06 RX ORDER — SIMETHICONE 80 MG
1 TABLET,CHEWABLE ORAL 3 TIMES DAILY PRN
Status: DISCONTINUED | OUTPATIENT
Start: 2025-06-06 | End: 2025-06-06 | Stop reason: HOSPADM

## 2025-06-06 RX ADMIN — CEFTRIAXONE SODIUM 2 G: 2 INJECTION, POWDER, FOR SOLUTION INTRAMUSCULAR; INTRAVENOUS at 12:06

## 2025-06-06 RX ADMIN — BISACODYL 10 MG: 10 SUPPOSITORY RECTAL at 12:06

## 2025-06-06 RX ADMIN — POLYETHYLENE GLYCOL 3350 17 G: 17 POWDER, FOR SOLUTION ORAL at 12:06

## 2025-06-06 RX ADMIN — METHOCARBAMOL 500 MG: 100 INJECTION INTRAMUSCULAR; INTRAVENOUS at 05:06

## 2025-06-06 RX ADMIN — SENNOSIDES AND DOCUSATE SODIUM 1 TABLET: 50; 8.6 TABLET ORAL at 12:06

## 2025-06-06 RX ADMIN — MORPHINE SULFATE 2 MG: 4 INJECTION INTRAVENOUS at 08:06

## 2025-06-06 RX ADMIN — OXYCODONE 10 MG: 5 TABLET ORAL at 05:06

## 2025-06-06 RX ADMIN — OXYCODONE 10 MG: 5 TABLET ORAL at 12:06

## 2025-06-06 RX ADMIN — SIMETHICONE 80 MG: 80 TABLET, CHEWABLE ORAL at 12:06

## 2025-06-06 RX ADMIN — HYDROCHLOROTHIAZIDE 12.5 MG: 12.5 TABLET ORAL at 08:06

## 2025-06-06 NOTE — PLAN OF CARE
[facility] - Med Surg 3  Discharge Final Note    Primary Care Provider: No, Primary Doctor    Expected Discharge Date: 6/6/2025    Final Discharge Note (most recent)       Final Note - 06/06/25 1301          Final Note    Assessment Type Final Discharge Note     Anticipated Discharge Disposition Home or Self Care     Hospital Resources/Appts/Education Provided Patient refused appointment set-up        Post-Acute Status    Discharge Delays None known at this time                     Important Message from Medicare             Contact Info       Benson Martinez MD   Specialty: Colon and Rectal Surgery    [address] [place] [address] [address] [address]   Phone: [phone]       Next Steps: Schedule an appointment as soon as possible for a visit in 1 week(s)    Instructions: hospital follow up    Your Primary Care Provider        Next Steps: Schedule an appointment as soon as possible for a visit in 3 day(s)    Instructions: hospital follow up          Patient unable to follow up with [facility] PCP in [facility], can be seen at [facility].  Discussed with patient, who declined appointment.  She will arrange follow up with non-[facility] pcp.

## 2025-06-06 NOTE — ASSESSMENT & PLAN NOTE
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

## 2025-06-06 NOTE — ASSESSMENT & PLAN NOTE
Now s/p robotic cholecystectomy on 6/5/25    - doing well postop  - okay for reg diet  - PO pain control  - cleared for discharge from surgical standpoint. Followup in clinic in 2-3 weeks for postop check

## 2025-06-06 NOTE — SUBJECTIVE & OBJECTIVE
Interval History: Tolerating diet, pain improved.    Medications:  Continuous Infusions:  Scheduled Meds:   cefTRIAXone (Rocephin) IV (PEDS and ADULTS)  2 g Intravenous Q24H    hydroCHLOROthiazide  12.5 mg Oral Daily    polyethylene glycol  17 g Oral Daily    senna-docusate  1 tablet Oral BID     PRN Meds:  Current Facility-Administered Medications:     acetaminophen, 650 mg, Oral, Q4H PRN    bisacodyL, 10 mg, Rectal, Daily PRN    dextrose 50%, 12.5 g, Intravenous, PRN    dextrose 50%, 25 g, Intravenous, PRN    glucagon (human recombinant), 1 mg, Intramuscular, PRN    glucose, 16 g, Oral, PRN    glucose, 24 g, Oral, PRN    hydrALAZINE, 10 mg, Intravenous, Q6H PRN    melatonin, 6 mg, Oral, Nightly PRN    morphine, 2 mg, Intravenous, Q3H PRN    naloxone, 0.02 mg, Intravenous, PRN    ondansetron, 4 mg, Intravenous, Q6H PRN    oxyCODONE, 10 mg, Oral, Q4H PRN    oxyCODONE, 5 mg, Oral, Q4H PRN    simethicone, 1 tablet, Oral, TID PRN    sodium chloride 0.9%, 3 mL, Intravenous, Q8H PRN     Review of patient's allergies indicates:  No Known Allergies  Objective:     Vital Signs (Most Recent):  Temp: 98.8 °F (37.1 °C) (06/06/25 1226)  Pulse: 65 (06/06/25 1313)  Resp: 16 (06/06/25 1226)  BP: 129/78 (06/06/25 1226)  SpO2: 99 % (06/06/25 1226) Vital Signs (24h Range):  Temp:  [97.9 °F (36.6 °C)-98.8 °F (37.1 °C)] 98.8 °F (37.1 °C)  Pulse:  [60-79] 65  Resp:  [13-20] 16  SpO2:  [95 %-100 %] 99 %  BP: (111-151)/(55-80) 129/78     Weight: 106.7 kg (235 lb 3.7 oz)  Body mass index is 39.14 kg/m².    Intake/Output - Last 3 Shifts         06/04 0700  06/05 0659 06/05 0700  06/06 0659 06/06 0700  06/07 0659    Other  120     IV Piggyback  1500     Total Intake(mL/kg)  1620 (15.2)     Blood  15     Total Output  15     Net  +1605            Unmeasured Urine Occurrence  1 x     Unmeasured Stool Occurrence 1 x 1 x              Physical Exam  Constitutional:       Appearance: She is well-developed.   HENT:      Head: Normocephalic and  atraumatic.   Eyes:      Conjunctiva/sclera: Conjunctivae normal.   Neck:      Thyroid: No thyromegaly.   Cardiovascular:      Rate and Rhythm: Normal rate.   Pulmonary:      Effort: Pulmonary effort is normal. No respiratory distress.   Abdominal:      Comments: Soft, nondistended, appropriately TTP; incisions c/d/I without erythema or drainage   Musculoskeletal:         General: No tenderness. Normal range of motion.      Cervical back: Normal range of motion.   Skin:     General: Skin is warm and dry.      Capillary Refill: Capillary refill takes less than 2 seconds.      Findings: No rash.   Neurological:      Mental Status: She is alert and oriented to person, place, and time.          Significant Labs:  I have reviewed all pertinent lab results within the past 24 hours.  CBC:   Recent Labs   Lab 06/06/25  0811   WBC 12.40   RBC 4.28   HGB 12.7   HCT 39.2      MCV 92   MCH 29.7   MCHC 32.4     BMP:   Recent Labs   Lab 06/04/25  1101 06/05/25  0427 06/06/25  0811   GLU  --    < > 86   NA  --    < > 135*   K  --    < > 3.3*   CL  --    < > 106   CO2  --    < > 16*   BUN  --    < > 5*   CREATININE  --    < > 0.6   CALCIUM  --    < > 8.2*   MG 1.9  --   --     < > = values in this interval not displayed.       Significant Diagnostics:  I have reviewed all pertinent imaging results/findings within the past 24 hours.

## 2025-06-06 NOTE — DISCHARGE SUMMARY
[facility] - Med Surg 3  Hospital Medicine  Discharge Summary      Patient Name: Rhona Zaman  MRN: [id number]  [facility]: [id number]  Patient Class: IP- Inpatient  Admission Date: 6/3/2025  Hospital Length of Stay: 1 days  Discharge Date and Time: 06/06/2025 12:43 PM  Attending Physician: Francisco Monet MD   Discharging Provider: Francisco Monet MD  Primary Care Provider: Jany, Primary Doctor    Primary Care Team: Networked reference to record PCT     HPI:    Patient is a 39-year-old female with past medical history significant for hypertension, pulmonary embolism in 2020, and tobacco abuse who presented  to ED with complaint of generalized abdominal pain. She also reports nausea, vomiting, constipation, and chest tightness. She denies vomiting, dysuria, fever, chills, back pain, shortness of breath, productive cough, weakness, edema. on arrival to ED, temp 98.5°, heart rate 88, respirations 17, blood pressure 140/82, 98% SpO2 on room air. lab workup shows WBC 13.65, hemoglobin 12.6, hematocrit 38.1, platelets 363, sodium 135, potassium 3.4, chloride 104, CO2 17, BUN 8, creatinine 0.7, glucose 158, alk-phos 97, AST 26, ALT 23, lipase 18, negative covid 19 screening, negative influenza A/B screening, rapid HIV negative, UA  done with no signs of infection.  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 While in ED, she was given IV Zosyn, dilaudid, morphine, zofran, and phenergan. General surgery was consulted per ED. Hospital Medicine was consulted for admission due to abdominal pain. General surgery recommended US gallbladder, which is ordered, pending.    Procedure(s) (LRB):  XI ROBOTIC CHOLECYSTECTOMY (N/A)      Hospital Course:    06/04/2025  Patient reports mild improvement in abdominal pain.  HIDA scan pending this a.m..  Continue empiric Zosyn.  Hypokalemia noted.  Will replete.  6/5 awaiting lap andra per surgery. +anxiety. Complains of abdominal pain.    6/6   Post op day 1 status post robotic andra per surgery. Tolerated well. Received empiric intravenous antibiotic(s). Diet advanced to full liquid. After discussing with surgery, ok to discharge home. Counseling provided regarding avoidance of constipation and remaining regular given abdominal surgeries. Abdominal discomfort with bloating. Calprotectin within normal limits.     No acute distress. No respiratory distress. On room air. Abdominal distended but soft. Tenderness to palpation. Laparoscopic incisions healing well with no overt signs or symptoms of infection. Obese. AO3. Anxious  patient requested pain medication(s) on discharge     Goals of Care Treatment Preferences:  Code Status: Full Code      SDOH Screening:  The patient was screened for utility difficulties, food insecurity, transport difficulties, housing insecurity, and interpersonal safety and there were no concerns identified this admission.     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, also suggestive of colitis  -- surgery consulted per ED, recommended US abdomen  -- consult vascular surgery for additional recommendations regarding right renal  artery aneurysm  -- IV Zosyn for treatment of colitis  -- NPO  -- PRN pain medications ordered, PRN antiemetics ordered    6/6/2025  Hida positive for acute cholecystitis   On intravenous antibiotic(s)  Npo  Pending surgical intervention            Cholelithiasis  General surgery consulted   NPO for now  Hida positive for acute cholecystitis  Pending surgical intervention     Colitis  IV antibiotic(s)   PRN pain medication ordered  Npo   Calprotectin pending      Essential hypertension  Patient's blood pressure range in the last 24 hours was: BP  Min: 111/55  Max: 151/77.The patient's inpatient anti-hypertensive regimen is listed below:  Current Antihypertensives  hydrALAZINE injection 10 mg, Every 6 hours PRN, Intravenous  hydroCHLOROthiazide tablet 12.5 mg, Daily, Oral    Plan  - BP is controlled, no changes needed to their regimen  - Pain control helped with BP, holding home HCTZ for now  Renal artery aneurysm  Consult vascular surgery  Follow up outpatient   Tobacco dependency  Dangers of cigarette smoking were reviewed with patient in detail. Patient was Referred to Tobacco Cessation Program. Nicotine replacement options were discussed. Nicotine replacement was discussed- prescribed  Final Active Diagnoses:    Diagnosis Date Noted POA    PRINCIPAL PROBLEM:  Pain of upper abdomen [R10.10] 06/03/2025 Yes    Tobacco dependency [F17.200] 06/05/2025 Yes    Cholelithiasis [K80.20] 06/03/2025 Yes    Colitis [K52.9] 06/03/2025 Yes    Essential hypertension [I10] 06/03/2025 Yes    Renal artery aneurysm [I72.2] 06/03/2025 Yes      Problems Resolved During this Admission:       Discharged Condition: stable    Disposition: Home or Self Care    Follow Up:   Follow-up Information       Benson Martinez MD. Schedule an appointment as soon as possible for a visit in 1 week(s).    Specialty: Colon and Rectal Surgery  Why: hospital follow up  Contact information:  [address] [place]  [address]  [phone]               Your Primary Care Provider. Schedule an appointment as soon as possible for a visit in 3 day(s).    Why: hospital follow up                         Patient Instructions:      Ambulatory referral/consult to Smoking Cessation Program   Standing Status: Future   Referral Priority: Routine Referral Type: Consultation   Referral Reason: Specialty Services Required   Requested Specialty: CTTS   Number of Visits Requested: 1     Diet full liquid     Activity as tolerated       Significant Diagnostic Studies: Labs: CMP   Recent Labs   Lab 06/05/25  0427 06/06/25  0811   * 135*   K 3.7 3.3*    106   CO2 18* 16*   * 86   BUN 5* 5*   CREATININE 0.7 0.6   CALCIUM 8.6* 8.2*   PROT 7.8 7.5   ALBUMIN 3.3* 3.2*   BILITOT 0.4 0.3   ALKPHOS 90 86   AST 17 50*   ALT 22 55*   ANIONGAP 10 13   , CBC   Recent Labs   Lab 06/05/25  0427 06/06/25  0811   WBC 11.33 12.40   HGB 12.4 12.7   HCT 38.3 39.2    310   , All labs within the past 24 hours have been reviewed, and lactate 1.1, calprotectin 44 (normal)  Microbiology: flu negative   Radiology: CT scan: CT ABDOMEN PELVIS WITH CONTRAST:   Results for orders placed or performed during the hospital encounter of 06/03/25   CT Abdomen Pelvis With IV Contrast NO Oral Contrast    Narrative    EXAMINATION:  CT ABDOMEN PELVIS WITH IV CONTRAST    CLINICAL HISTORY:  LLQ abdominal pain;    TECHNIQUE:  Low dose axial images, sagittal and coronal reformations were obtained from the lung bases to the pubic symphysis.  Contrast was not administered.    COMPARISON:  None    FINDINGS:  Heart: Normal in size. No pericardial effusion.    Lung Bases: Well aerated, without consolidation or pleural fluid.    Liver: Normal in size and attenuation, with no focal hepatic lesions.    Gallbladder: Cholelithiasis.  Mild gallbladder distension.    Bile Ducts: Borderline dilation.  No stones.    Pancreas: No mass or peripancreatic fat stranding.    Spleen:  Unremarkable.    Adrenals: Unremarkable.    Kidneys/ Ureters: Nonobstructing renal stones.  No hydronephrosis..    Bladder: Bladder wall thickening.  Correlation with urinalysis to exclude infection.    Reproductive organs: Unremarkable.    GI Tract/Mesentery: Mild wall thickening of the left colon.  This could relate to incomplete distension.  Correlation to exclude colitis.  This also affects the sigmoid colon.    Peritoneal Space: No ascites. No free air.    Retroperitoneum: No significant adenopathy.    Abdominal wall: Unremarkable.    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  Aorta intact.    Bones: No acute fracture.      Impression    Borderline findings for colitis.    Cholelithiasis in a distended gallbladder.  Further evaluation as needed.  No wall thickening or local fluid to specifically suggest acute cholecystitis but this is difficult to exclude.    Borderline findings for cystitis.  Correlation with urinalysis.    Complete findings as above.    This report was flagged in Epic as abnormal.  The preliminary and final reports are concordant.    All CT scans at this facility are performed  using dose modulation techniques as appropriate to performed exam including the following:  automated exposure control; adjustment of mA and/or kV according to the patients size (this includes techniques or standardized protocols for targeted exams where dose is matched to indication/reason for exam: i.e. extremities or head);  iterative reconstruction technique.      Electronically signed by: Leoncio Cardenas  Date:    06/03/2025  Time:    07:49     Ultrasound: abdomen limited hepatomegaly, mild gallbladder thickening  Nuclear Medicine: hida positive for acute cholecystitis    Pending Diagnostic Studies:       None           Medications:  Reconciled Home Medications:      Medication List        START taking these medications      nicotine 7 mg/24 hr  Commonly known as: NICODERM CQ  Place 1 patch onto the skin once  daily.            CONTINUE taking these medications      hydroCHLOROthiazide 12.5 mg capsule  Commonly known as: MICROZIDE  hydrochlorothiazide Take No date recorded No form recorded No frequency recorded No route recorded No set duration recorded No set duration amount recorded active No dosage strength recorded No dosage strength units of measure recorded     ibuprofen 600 MG tablet  Commonly known as: ADVIL,MOTRIN  Take 1 tablet (600 mg total) by mouth 3 (three) times daily.     oxyCODONE-acetaminophen 5-325 mg per tablet  Commonly known as: PERCOCET  Take 1 tablet by mouth every 4 (four) hours as needed for Pain. for pain.              Indwelling Lines/Drains at time of discharge:   Lines/Drains/Airways       None                   Time spent on the discharge of patient: 37 minutes         Francisco Monet MD  Department of Hospital Medicine  [facility] 3

## 2025-06-06 NOTE — PROGRESS NOTES
[facility] - Med Surg 3  General Surgery  Progress Note    Subjective:     Interval History: Tolerating diet, pain improved.    Medications:  Continuous Infusions:  Scheduled Meds:   cefTRIAXone (Rocephin) IV (PEDS and ADULTS)  2 g Intravenous Q24H    hydroCHLOROthiazide  12.5 mg Oral Daily    polyethylene glycol  17 g Oral Daily    senna-docusate  1 tablet Oral BID     PRN Meds:  Current Facility-Administered Medications:     acetaminophen, 650 mg, Oral, Q4H PRN    bisacodyL, 10 mg, Rectal, Daily PRN    dextrose 50%, 12.5 g, Intravenous, PRN    dextrose 50%, 25 g, Intravenous, PRN    glucagon (human recombinant), 1 mg, Intramuscular, PRN    glucose, 16 g, Oral, PRN    glucose, 24 g, Oral, PRN    hydrALAZINE, 10 mg, Intravenous, Q6H PRN    melatonin, 6 mg, Oral, Nightly PRN    morphine, 2 mg, Intravenous, Q3H PRN    naloxone, 0.02 mg, Intravenous, PRN    ondansetron, 4 mg, Intravenous, Q6H PRN    oxyCODONE, 10 mg, Oral, Q4H PRN    oxyCODONE, 5 mg, Oral, Q4H PRN    simethicone, 1 tablet, Oral, TID PRN    sodium chloride 0.9%, 3 mL, Intravenous, Q8H PRN     Review of patient's allergies indicates:  No Known Allergies  Objective:     Vital Signs (Most Recent):  Temp: 98.8 °F (37.1 °C) (06/06/25 1226)  Pulse: 65 (06/06/25 1313)  Resp: 16 (06/06/25 1226)  BP: 129/78 (06/06/25 1226)  SpO2: 99 % (06/06/25 1226) Vital Signs (24h Range):  Temp:  [97.9 °F (36.6 °C)-98.8 °F (37.1 °C)] 98.8 °F (37.1 °C)  Pulse:  [60-79] 65  Resp:  [13-20] 16  SpO2:  [95 %-100 %] 99 %  BP: (111-151)/(55-80) 129/78     Weight: 106.7 kg (235 lb 3.7 oz)  Body mass index is 39.14 kg/m².    Intake/Output - Last 3 Shifts         06/04 0700  06/05 0659 06/05 0700  06/06 0659 06/06 0700  06/07 0659    Other  120     IV Piggyback  1500     Total Intake(mL/kg)  1620 (15.2)     Blood  15     Total Output  15     Net  +1605            Unmeasured Urine Occurrence  1 x     Unmeasured Stool Occurrence 1 x 1 x              Physical Exam  Constitutional:        Appearance: She is well-developed.   HENT:      Head: Normocephalic and atraumatic.   Eyes:      Conjunctiva/sclera: Conjunctivae normal.   Neck:      Thyroid: No thyromegaly.   Cardiovascular:      Rate and Rhythm: Normal rate.   Pulmonary:      Effort: Pulmonary effort is normal. No respiratory distress.   Abdominal:      Comments: Soft, nondistended, appropriately TTP; incisions c/d/I without erythema or drainage   Musculoskeletal:         General: No tenderness. Normal range of motion.      Cervical back: Normal range of motion.   Skin:     General: Skin is warm and dry.      Capillary Refill: Capillary refill takes less than 2 seconds.      Findings: No rash.   Neurological:      Mental Status: She is alert and oriented to person, place, and time.          Significant Labs:  I have reviewed all pertinent lab results within the past 24 hours.  CBC:   Recent Labs   Lab 06/06/25  0811   WBC 12.40   RBC 4.28   HGB 12.7   HCT 39.2      MCV 92   MCH 29.7   MCHC 32.4     BMP:   Recent Labs   Lab 06/04/25  1101 06/05/25  0427 06/06/25  0811   GLU  --    < > 86   NA  --    < > 135*   K  --    < > 3.3*   CL  --    < > 106   CO2  --    < > 16*   BUN  --    < > 5*   CREATININE  --    < > 0.6   CALCIUM  --    < > 8.2*   MG 1.9  --   --     < > = values in this interval not displayed.       Significant Diagnostics:  I have reviewed all pertinent imaging results/findings within the past 24 hours.  Assessment/Plan:     * Pain of upper abdomen  Now s/p robotic cholecystectomy on 6/5/25    - doing well postop  - okay for reg diet  - PO pain control  - cleared for discharge from surgical standpoint. Followup in clinic in 2-3 weeks for postop check    Renal artery aneurysm  Management per primary team and vascular    Essential hypertension  Management per primary team    Colitis  Management per primary team    Cholelithiasis  See plan for pain of upper abdomen      eBnson Martinez MD  General Surgery  [facility] Med S[facility]  3

## 2025-06-06 NOTE — ASSESSMENT & PLAN NOTE
Patient's blood pressure range in the last 24 hours was: BP  Min: 111/55  Max: 151/77.The patient's inpatient anti-hypertensive regimen is listed below:  Current Antihypertensives  hydrALAZINE injection 10 mg, Every 6 hours PRN, Intravenous  hydroCHLOROthiazide tablet 12.5 mg, Daily, Oral    Plan  - BP is controlled, no changes needed to their regimen  - Pain control helped with BP, holding home HCTZ for now

## 2025-06-06 NOTE — DISCHARGE INSTRUCTIONS
A nurse practitioner may be contacting you to assess your status post-hospitalization.     You have been started on new medication(s) from this hospitalization. Please take as directed and schedule hospital follow up appointments with your primary providers.

## 2025-06-09 ENCOUNTER — TELEPHONE (OUTPATIENT)
Dept: HOME HEALTH SERVICES | Facility: CLINIC | Age: 40
End: 2025-06-09
Payer: MEDICAID

## 2025-06-09 ENCOUNTER — TELEPHONE (OUTPATIENT)
Dept: SURGERY | Facility: CLINIC | Age: 40
End: 2025-06-09
Payer: MEDICAID

## 2025-06-09 NOTE — TELEPHONE ENCOUNTER
Called pt per message received. Scheduled pt's PO appt with Dr. Martinez. Date and time of appointment and location of clinic given. Verbalized understanding, no further questions at this time.     Copied from CRM #[id number]. Topic: General Inquiry - Patient Advice  >> Jun 9, 2025  9:01 LANIE Saleh wrote:  Pt is calling in regards to getting a f/u appt.please call pt back at .[phone]

## 2025-06-09 NOTE — TELEPHONE ENCOUNTER
Called pt per message received. Pt reports 10/10 pain, unable to walk because of this. Pt verbally aggravated with discharge/post-op instructions from hospital staff. Offered patient relations number to pt, she states she is already in contact with them. Message sent to provider regarding refill of Oxy. Verbalized understanding, no further questions at this time.     Copied from CRM #[id number]. Topic: Medications - Medication Refill  >> Jun 9, 2025  8:56 AM Therese wrote:  .Type:  RX Refill Request    Who Called: Rhona  Refill or New Rx:refill  RX Name and Strength:oxyCODONE-acetaminophen (PERCOCET) 5-325 mg per tablet  How is the patient currently taking it? (ex. 1XDay):Route: Take 1 tablet by mouth every 8 (eight) hours as needed for Pain  Is this a 30 day or 90 day RX:olga lidia  Preferred Pharmacy with phone number:.  [id number] [place], [place] - [address] AT [facility] [address]  [address] [facility] [address]  Phone: [phone] Fax: [phone]  Local or Mail Order:local  Ordering Provider:Dr. Michelle Shahid  Would the patient rather a call back or a response via [facility]? Call back  Best Call Back Number:.[phone]     Additional Information: olga lidia

## 2025-06-10 ENCOUNTER — OFFICE VISIT (OUTPATIENT)
Dept: HOME HEALTH SERVICES | Facility: CLINIC | Age: 40
End: 2025-06-10
Payer: MEDICAID

## 2025-06-10 VITALS
DIASTOLIC BLOOD PRESSURE: 70 MMHG | SYSTOLIC BLOOD PRESSURE: 132 MMHG | OXYGEN SATURATION: 98 % | HEART RATE: 71 BPM | RESPIRATION RATE: 18 BRPM

## 2025-06-10 DIAGNOSIS — Z09 HOSPITAL DISCHARGE FOLLOW-UP: Primary | ICD-10-CM

## 2025-06-10 DIAGNOSIS — I10 ESSENTIAL HYPERTENSION: ICD-10-CM

## 2025-06-10 DIAGNOSIS — I72.2 RENAL ARTERY ANEURYSM: ICD-10-CM

## 2025-06-10 DIAGNOSIS — Z90.49 S/P CHOLECYSTECTOMY: Primary | ICD-10-CM

## 2025-06-10 DIAGNOSIS — K80.80 BILIARY CALCULUS OF OTHER SITE WITHOUT OBSTRUCTION: ICD-10-CM

## 2025-06-10 DIAGNOSIS — Z90.49 S/P CHOLECYSTECTOMY: ICD-10-CM

## 2025-06-10 PROCEDURE — 1111F DSCHRG MED/CURRENT MED MERGE: CPT | Mod: CPTII,S$GLB,,

## 2025-06-10 PROCEDURE — 3075F SYST BP GE 130 - 139MM HG: CPT | Mod: CPTII,S$GLB,,

## 2025-06-10 PROCEDURE — 99495 TRANSJ CARE MGMT MOD F2F 14D: CPT | Mod: S$GLB,,,

## 2025-06-10 PROCEDURE — 1160F RVW MEDS BY RX/DR IN RCRD: CPT | Mod: CPTII,S$GLB,,

## 2025-06-10 PROCEDURE — 1159F MED LIST DOCD IN RCRD: CPT | Mod: CPTII,S$GLB,,

## 2025-06-10 PROCEDURE — 3078F DIAST BP <80 MM HG: CPT | Mod: CPTII,S$GLB,,

## 2025-06-10 RX ORDER — OXYCODONE HYDROCHLORIDE 5 MG/1
5 TABLET ORAL EVERY 6 HOURS PRN
Qty: 15 TABLET | Refills: 0 | Status: SHIPPED | OUTPATIENT
Start: 2025-06-10

## 2025-06-10 NOTE — ASSESSMENT & PLAN NOTE
Robotic cholecystectomy 6/5  Still with some abdominal pain but improved since discharge  Tylenol/ibuprofen prn pain  F/U with surgery 6/30

## 2025-06-10 NOTE — PROGRESS NOTES
[facility]  Transitional Care Management (TCM) Home Visit    Encounter Provider: Star Cosby   PCP: No, Primary Doctor  Consult Requested By: Dr. Francisco Monet  Admit Date: 6/3/25   IP Discharge Date: 6/6/25  Hospital Length of Stay:RRHLOS@ days  Days since discharge (from IP or SNF): 4   [facility] Contact Note: 6/9  Hospital Diagnosis: Cholecystitis [K81.9]     HISTORY OF PRESENT ILLNESS      Patient ID: Rhona Zaman is a 39 y.o. female was recently admitted to the hospital, this is their TCM encounter.    Hospital Course Synopsis:   Patient is a 39-year-old female with past medical history significant for hypertension, pulmonary embolism in 2020, and tobacco abuse who presented  to ED with complaint of generalized abdominal pain. She also reports nausea, vomiting, constipation, and chest tightness. She denies vomiting, dysuria, fever, chills, back pain, shortness of breath, productive cough, weakness, edema. on arrival to ED, temp 98.5°, heart rate 88, respirations 17, blood pressure 140/82, 98% SpO2 on room air. lab workup shows WBC 13.65, hemoglobin 12.6, hematocrit 38.1, platelets 363, sodium 135, potassium 3.4, chloride 104, CO2 17, BUN 8, creatinine 0.7, glucose 158, alk-phos 97, AST 26, ALT 23, lipase 18, negative covid 19 screening, negative influenza A/B screening, rapid HIV negative, UA  done with no signs of infection.  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 While in ED, she was given IV Zosyn, dilaudid, morphine, zofran, and phenergan. General surgery was consulted per ED. Hospital Medicine was consulted for admission due to abdominal pain. General surgery recommended US gallbladder, which  is ordered, pending.     Procedure(s) (LRB):  XI ROBOTIC CHOLECYSTECTOMY (N/A)       Hospital Course:   06/04/2025  Patient reports mild improvement in abdominal pain.  HIDA scan pending this a.m..  Continue empiric Zosyn.  Hypokalemia noted.  Will replete.  6/5 awaiting lap andra per surgery. +anxiety. Complains of abdominal pain.     6/6   Post op day 1 status post robotic andra per surgery. Tolerated well. Received empiric intravenous antibiotic(s). Diet advanced to full liquid. After discussing with surgery, ok to discharge home. Counseling provided regarding avoidance of constipation and remaining regular given abdominal surgeries. Abdominal discomfort with bloating. Calprotectin within normal limits.      No acute distress. No respiratory distress. On room air. Abdominal distended but soft. Tenderness to palpation. Laparoscopic incisions healing well with no overt signs or symptoms of infection. Obese. AO3. Anxious  patient requested pain medication(s) on discharge      Patient is being seen today for a hospital follow up. She underwent a robotic cholecystectomy during this admission. Doing ok since discharge. Tolerating a regular diet. Reports having bowel movements. Lap sites healing well, surgical adhesive in place. Still with some abdominal pain. Instructed to take tylenol as needed for pain. Denies further complaints or concerns. Questions elicited. Time allowed for questions, all issues addressed. Contact info given for any concerns or changes. Has follow up with surgeon on 6/30.   DECISION MAKING TODAY       Assessment & Plan:  1. Hospital discharge follow-up    2. Biliary calculus of other site without obstruction  Assessment & Plan:  CT abdomen and pelvis with IV contrast showed mild thickening of walls of left colon and sigmoid small, incomplete distention versus mild colitis, cholelithiasis and a distended gallbladder with stone in gallbladder neck  HIDA revealed acute cholecystitis  S/P robotic  cholecystectomy 6/5      3. S/P cholecystectomy  Assessment & Plan:  Robotic cholecystectomy 6/5  Still with some abdominal pain but improved since discharge  Tylenol/ibuprofen prn pain  F/U with surgery 6/30      4. Essential hypertension  Assessment & Plan:  Chronic, stable  Continue current medications      5. 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 If you have any questions, ask your nurse or doctor.                CONTINUE taking these medications      hydroCHLOROthiazide 12.5 mg capsule  Commonly known as: MICROZIDE  hydrochlorothiazide Take No date recorded No form recorded No frequency recorded No route recorded No set duration recorded No set duration amount recorded active No dosage strength recorded No dosage strength units of measure recorded     ibuprofen 600 MG tablet  Commonly known as: ADVIL,MOTRIN  Take 1 tablet (600 mg total) by mouth 3 (three) times daily.     nicotine 7 mg/24 hr  Commonly known as: NICODERM CQ  Place 1 patch onto the skin once daily.     oxyCODONE-acetaminophen 5-325 mg per tablet  Commonly known as: PERCOCET  Take 1 tablet by mouth every 8 (eight) hours as needed for Pain.              Medication Reconciliation:  Were medications changed on discharge? Yes  Were medications in the home? Yes  Is the patient taking the medications as directed? Yes  Does the patient understand the medications and changes? Yes  Does updated med list accurately reflects meds patient is currently taking? Yes    ENVIRONMENT OF CARE      Family and/or Caregiver present at visit?  Yes  Name of Caregiver: daughter  History provided by: patient    Advance Care Planning   Advanced Care Planning Status:  Patient has had an ACP conversation  Living Will: No  Power of : No  [place]: No    Does Caregiver have HCPoA:  No  Changes today: none  Is patient hospice appropriate: No  (If needed, use PPS <30 or FAST score >7)  Was referral to hospice placed: No       Impression upon entering the home:  Physical Dwelling: single family home   Appearance of home environment: cleaniness: clean, walking pathways: clear, lighting: adequate, and home structure: sound structure  Functional Status: independent  Mobility: ambulatory  Nutritional access: adequate intake and access  Home Health: No, and does not need it at this time   DME/Supplies: none     Diagnostic tests reviewed/disposition: No diagnosic tests pending after this hospitalization.  Disease/illness education: Take all medication as prescribed. Activity as tolerated. Keep all upcoming appts.   Establishment or re-establishment of referral orders for community resources: No other necessary community resources.   Discussion with other health care providers: No discussion with other health care providers necessary.   Does patient have a PCP at [place]? No   Repatriation plan with PCP? follow-up with PCP within 90d   Does patient have an ostomy (ileostomy, colostomy, suprapubic catheter, nephrostomy tube, tracheostomy, PEG tube, pleurex catheter, cholecystostomy, etc)? No  Were BPAs reviewed? Yes    Social History     Socioeconomic History    Marital status: Single   Tobacco Use    Smoking status: Some Days     Types: Cigarettes    Smokeless tobacco: Never     Social Drivers of Health     Financial Resource Strain: Low Risk  (6/3/2025)    Overall Financial Resource Strain (CARDIA)     Difficulty of Paying Living Expenses: Not hard at all   Food Insecurity: No Food Insecurity (6/3/2025)    Hunger Vital Sign     Worried About Running Out of Food in the Last Year: Never true     Ran Out of Food in the Last Year: Never true   Transportation Needs: No Transportation Needs (6/3/2025)    PRAPARE - Transportation     Lack of Transportation (Medical): No     Lack of Transportation (Non-Medical): No    Stress: No Stress Concern Present (6/3/2025)    [employer] - Occupational Stress Questionnaire     Feeling of Stress : Not at all   Housing Stability: Low Risk  (6/3/2025)    Housing Stability Vital Sign     Unable to Pay for Housing in the Last Year: No     Homeless in the Last Year: No       OBJECTIVE:     Vital Signs:  Vitals:    06/10/25 1155   BP: 132/70   Pulse: 71   Resp: 18       Review of Systems   Constitutional: Negative.    HENT: Negative.     Eyes: Negative.    Respiratory: Negative.  Negative for chest tightness.    Cardiovascular: Negative.  Negative for leg swelling.   Gastrointestinal:  Positive for abdominal pain.   Endocrine: Negative.    Genitourinary: Negative.    Musculoskeletal: Negative.    Skin: Negative.    Allergic/Immunologic: Negative.    Neurological: Negative.    Hematological: Negative.    Psychiatric/Behavioral: Negative.  Negative for agitation.    All other systems reviewed and are negative.      Physical Exam:  Physical Exam  Vitals reviewed.   Constitutional:       General: She is not in acute distress.     Appearance: Normal appearance. She is well-developed. She is obese.   HENT:      Head: Normocephalic and atraumatic.      Nose: Nose normal.      Mouth/Throat:      Mouth: Mucous membranes are dry.      Pharynx: Oropharynx is clear.   Eyes:      Pupils: Pupils are equal, round, and reactive to light.   Cardiovascular:      Rate and Rhythm: Normal rate and regular rhythm.      Pulses: Normal pulses.      Heart sounds: Normal heart sounds.   Pulmonary:      Effort: Pulmonary effort is normal.      Breath sounds: Normal breath sounds.   Abdominal:      General: Bowel sounds are normal.      Palpations: Abdomen is soft.      Comments: Surgical incisions healing well, dressing C/D/I  No erythema, edema, warmth, drainage   Musculoskeletal:         General: Normal range of motion.      Cervical back: Normal range of motion and neck supple.   Skin:      General: Skin is warm and dry.   Neurological:      General: No focal deficit present.      Mental Status: She is alert and oriented to person, place, and time. Mental status is at baseline.   Psychiatric:         Mood and Affect: Mood normal.         Behavior: Behavior normal.         Thought Content: Thought content normal.         Judgment: Judgment normal.         INSTRUCTIONS FOR PATIENT:   - Continue all medications, treatments and therapies as ordered.   - Follow all instructions, recommendations as discussed.  - Maintain Safety Precautions at all times.  - Attend all medical appointments as scheduled.  - For worsening symptoms: call Primary Care Physician or Nurse Practitioner.  - For emergencies, call 911 or immediately report to the nearest emergency room.   Scheduled Follow-up, Appts Reviewed with Modifications if Needed: Yes  Future Appointments   Date Time Provider Department Center   6/25/2025 10:00 AM Kendell Fish NP [facility]   6/30/2025 11:00 AM Benson Martinez MD [facility] CLRCSR [facility]   9/8/2025  9:30 AM ON CVT VASCULAR ULTRASOUND [facility] [place]   9/8/2025 10:00 AM Elisabeth Collins MD [facility]  Medical [facility]   10/14/2025  7:50 AM [facility]  MAMMO1 SCREEN [facility] MAMMO [facility]   10/14/2025  8:50 AM Vipin Painter III, MD [facility] OBGYN [facility]       Signature: Star Cosby NP    Transition of Care Visit:  I have reviewed and updated the history and problem list.  I have reconciled the medication list.  I have discussed the hospitalization and current medical issues, prognosis and plans with the patient/family.

## 2025-06-10 NOTE — ASSESSMENT & PLAN NOTE
CT abdomen and pelvis with IV contrast showed mild thickening of walls of left colon and sigmoid small, incomplete distention versus mild colitis, cholelithiasis and a distended gallbladder with stone in gallbladder neck  HIDA revealed acute cholecystitis  S/P robotic cholecystectomy 6/5

## 2025-06-11 LAB
ESTROGEN SERPL-MCNC: NORMAL PG/ML
INSULIN SERPL-ACNC: NORMAL U[IU]/ML
LAB AP CLINICAL INFORMATION: NORMAL
LAB AP GROSS DESCRIPTION: NORMAL
LAB AP PERFORMING LOCATION(S): NORMAL
LAB AP REPORT FOOTNOTES: NORMAL

## 2025-06-18 LAB
OHS QRS DURATION: 80 MS
OHS QTC CALCULATION: 414 MS

## 2025-06-30 ENCOUNTER — OFFICE VISIT (OUTPATIENT)
Dept: SURGERY | Facility: CLINIC | Age: 40
End: 2025-06-30
Payer: MEDICAID

## 2025-06-30 ENCOUNTER — LAB VISIT (OUTPATIENT)
Dept: LAB | Facility: HOSPITAL | Age: 40
End: 2025-06-30
Attending: COLON & RECTAL SURGERY
Payer: MEDICAID

## 2025-06-30 VITALS
TEMPERATURE: 99 F | HEIGHT: 65 IN | OXYGEN SATURATION: 98 % | WEIGHT: 226.5 LBS | BODY MASS INDEX: 37.74 KG/M2 | HEART RATE: 88 BPM | DIASTOLIC BLOOD PRESSURE: 85 MMHG | SYSTOLIC BLOOD PRESSURE: 128 MMHG

## 2025-06-30 DIAGNOSIS — K80.80 BILIARY CALCULUS OF OTHER SITE WITHOUT OBSTRUCTION: ICD-10-CM

## 2025-06-30 DIAGNOSIS — R10.9 ABDOMINAL PAIN, UNSPECIFIED ABDOMINAL LOCATION: Primary | ICD-10-CM

## 2025-06-30 DIAGNOSIS — R10.9 ABDOMINAL PAIN, UNSPECIFIED ABDOMINAL LOCATION: ICD-10-CM

## 2025-06-30 DIAGNOSIS — Z90.49 S/P CHOLECYSTECTOMY: ICD-10-CM

## 2025-06-30 LAB
ABSOLUTE EOSINOPHIL (OHS): 0.13 K/UL
ABSOLUTE MONOCYTE (OHS): 0.65 K/UL (ref 0.3–1)
ABSOLUTE NEUTROPHIL COUNT (OHS): 4.68 K/UL (ref 1.8–7.7)
ANION GAP (OHS): 9 MMOL/L (ref 8–16)
BASOPHILS # BLD AUTO: 0.06 K/UL
BASOPHILS NFR BLD AUTO: 0.7 %
BUN SERPL-MCNC: 6 MG/DL (ref 6–20)
CALCIUM SERPL-MCNC: 8.5 MG/DL (ref 8.7–10.5)
CHLORIDE SERPL-SCNC: 106 MMOL/L (ref 95–110)
CO2 SERPL-SCNC: 23 MMOL/L (ref 23–29)
CREAT SERPL-MCNC: 0.6 MG/DL (ref 0.5–1.4)
ERYTHROCYTE [DISTWIDTH] IN BLOOD BY AUTOMATED COUNT: 14.1 % (ref 11.5–14.5)
GFR SERPLBLD CREATININE-BSD FMLA CKD-EPI: >60 ML/MIN/1.73/M2
GLUCOSE SERPL-MCNC: 106 MG/DL (ref 70–110)
HCT VFR BLD AUTO: 39.6 % (ref 37–48.5)
HGB BLD-MCNC: 12.7 GM/DL (ref 12–16)
IMM GRANULOCYTES # BLD AUTO: 0.13 K/UL (ref 0–0.04)
IMM GRANULOCYTES NFR BLD AUTO: 1.6 % (ref 0–0.5)
LYMPHOCYTES # BLD AUTO: 2.53 K/UL (ref 1–4.8)
MCH RBC QN AUTO: 30 PG (ref 27–31)
MCHC RBC AUTO-ENTMCNC: 32.1 G/DL (ref 32–36)
MCV RBC AUTO: 93 FL (ref 82–98)
NUCLEATED RBC (/100WBC) (OHS): 0 /100 WBC
PLATELET # BLD AUTO: 302 K/UL (ref 150–450)
PMV BLD AUTO: 11.1 FL (ref 9.2–12.9)
POTASSIUM SERPL-SCNC: 3.6 MMOL/L (ref 3.5–5.1)
RBC # BLD AUTO: 4.24 M/UL (ref 4–5.4)
RELATIVE EOSINOPHIL (OHS): 1.6 %
RELATIVE LYMPHOCYTE (OHS): 30.9 % (ref 18–48)
RELATIVE MONOCYTE (OHS): 7.9 % (ref 4–15)
RELATIVE NEUTROPHIL (OHS): 57.3 % (ref 38–73)
SODIUM SERPL-SCNC: 138 MMOL/L (ref 136–145)
WBC # BLD AUTO: 8.18 K/UL (ref 3.9–12.7)

## 2025-06-30 PROCEDURE — 85025 COMPLETE CBC W/AUTO DIFF WBC: CPT

## 2025-06-30 PROCEDURE — 36415 COLL VENOUS BLD VENIPUNCTURE: CPT

## 2025-06-30 PROCEDURE — 3079F DIAST BP 80-89 MM HG: CPT | Mod: CPTII,,, | Performed by: COLON & RECTAL SURGERY

## 2025-06-30 PROCEDURE — 80048 BASIC METABOLIC PNL TOTAL CA: CPT

## 2025-06-30 PROCEDURE — 99999 PR PBB SHADOW E&M-EST. PATIENT-LVL III: CPT | Mod: PBBFAC,,, | Performed by: COLON & RECTAL SURGERY

## 2025-06-30 PROCEDURE — 99024 POSTOP FOLLOW-UP VISIT: CPT | Mod: ,,, | Performed by: COLON & RECTAL SURGERY

## 2025-06-30 PROCEDURE — 3074F SYST BP LT 130 MM HG: CPT | Mod: CPTII,,, | Performed by: COLON & RECTAL SURGERY

## 2025-06-30 PROCEDURE — 1159F MED LIST DOCD IN RCRD: CPT | Mod: CPTII,,, | Performed by: COLON & RECTAL SURGERY

## 2025-06-30 PROCEDURE — 99213 OFFICE O/P EST LOW 20 MIN: CPT | Mod: PBBFAC | Performed by: COLON & RECTAL SURGERY

## 2025-06-30 NOTE — PROGRESS NOTES
History & Physical    SUBJECTIVE:     Chief Complaint   Patient presents with    Post-op Evaluation       History of Present Illness:  Patient is a 39 y.o. female presents for postoperative check after cholecystectomy.  Patient underwent robotic cholecystectomy on 2025 for acute cholecystitis.  She had an uneventful recovery in the hospital was discharged home.  Since discharge she has done mostly well.  She has had some difficulty with some pain control and a still complaining of some right lower quadrant abdominal pain.  States that the pain she was having before surgery has significantly improved/resolved.  Denies any fever, chills, nausea, vomiting currently.  No diarrhea but is having some intermittent constipation.    Review of patient's allergies indicates:  No Known Allergies    Current Medications[1]    Past Medical History:   Diagnosis Date    Abnormal Pap smear of cervix     Essential (primary) hypertension     Pulmonary embolism      Past Surgical History:   Procedure Laterality Date     SECTION      DIAGNOSTIC LAPAROSCOPY  2023    DILATION AND CURETTAGE OF UTERUS      ROBOT-ASSISTED CHOLECYSTECTOMY USING DA SAMIR XI N/A 2025    Procedure: XI ROBOTIC CHOLECYSTECTOMY;  Surgeon: Benson Martinez MD;  [facility];  Service: General;  Laterality: N/A;     No family history on file.  Social History[2]     Review of Systems:  Review of Systems   Constitutional:  Negative for activity change, appetite change, chills, fatigue, fever and unexpected weight change.   HENT:  Negative for congestion, ear pain, sore throat and trouble swallowing.    Eyes:  Negative for pain, redness and itching.   Respiratory:  Negative for cough, shortness of breath and wheezing.    Cardiovascular:  Negative for chest pain, palpitations and leg swelling.   Gastrointestinal:  Positive for abdominal pain. Negative for abdominal distention, anal bleeding, blood in stool, nausea, rectal pain and  "vomiting.   Endocrine: Negative for cold intolerance, heat intolerance and polyuria.   Genitourinary:  Negative for dysuria, flank pain, frequency and hematuria.   Musculoskeletal:  Negative for gait problem, joint swelling and neck pain.   Skin:  Negative for color change, rash and wound.   Allergic/Immunologic: Negative for environmental allergies and immunocompromised state.   Neurological:  Negative for dizziness, speech difficulty, weakness and numbness.   Psychiatric/Behavioral:  Negative for agitation, confusion and hallucinations.        OBJECTIVE:     Vital Signs (Most Recent)  Temp: 98.6 °F (37 °C) (06/30/25 1116)  Pulse: 88 (06/30/25 1116)  BP: 128/85 (06/30/25 1116)  SpO2: 98 % (06/30/25 1116)  5' 5" (1.651 m)  102.7 kg (226 lb 8.4 oz)     Physical Exam:  Physical Exam  Constitutional:       Appearance: She is well-developed.   HENT:      Head: Normocephalic and atraumatic.   Eyes:      Conjunctiva/sclera: Conjunctivae normal.   Neck:      Thyroid: No thyromegaly.   Cardiovascular:      Rate and Rhythm: Normal rate and regular rhythm.   Pulmonary:      Effort: Pulmonary effort is normal. No respiratory distress.   Abdominal:      Comments: Soft, nondistended, +mild RLQ TTP; no rebound or guarding; well-healing port sites   Musculoskeletal:         General: No tenderness. Normal range of motion.      Cervical back: Normal range of motion.   Skin:     General: Skin is warm and dry.      Capillary Refill: Capillary refill takes less than 2 seconds.      Findings: No rash.   Neurological:      Mental Status: She is alert and oriented to person, place, and time.         Laboratory  Lab Results   Component Value Date    WBC 12.40 06/06/2025    HGB 12.7 06/06/2025    HCT 39.2 06/06/2025     06/06/2025    ALT 55 (H) 06/06/2025    AST 50 (H) 06/06/2025     (L) 06/06/2025    K 3.3 (L) 06/06/2025     06/06/2025    CREATININE 0.6 06/06/2025    BUN 5 (L) 06/06/2025    CO2 16 (L) 06/06/2025 "       PATHOLOGY:  GALLBLADDER, CHOLECYSTECTOMY:   -  Chronic cholecystitis with mild activity.    -  Cholelithiasis.  -  Small focus of cholesterolosis.  -  No evidence of dysplasia or malignancy.    ASSESSMENT/PLAN:     38yo F who is s/p robotic cholecystectomy in June 2025    - send for CBC, CMP and CT Abd/Pelvis given ongoing pain, although unclear if this is related to surgery as it is not at surgical sites and is not in RUQ  - if workup negative, no further intervention required at this time  - continue lifting restrictions total 6 weeks after surgery  - RTC PRN    Benson Martinez MD  Colon and Rectal Surgery  [facility]       [1]   Current Outpatient Medications   Medication Sig Dispense Refill    hydroCHLOROthiazide (MICROZIDE) 12.5 mg capsule hydrochlorothiazide Take No date recorded No form recorded No frequency recorded No route recorded No set duration recorded No set duration amount recorded active No dosage strength recorded No dosage strength units of measure recorded      ibuprofen (ADVIL,MOTRIN) 600 MG tablet Take 1 tablet (600 mg total) by mouth 3 (three) times daily. 30 tablet 1    nicotine (NICODERM CQ) 7 mg/24 hr Place 1 patch onto the skin once daily. 7 patch 0    oxyCODONE (ROXICODONE) 5 MG immediate release tablet Take 1 tablet (5 mg total) by mouth every 6 (six) hours as needed for Pain. (Patient not taking: Reported on 6/30/2025) 15 tablet 0    oxyCODONE-acetaminophen (PERCOCET) 5-325 mg per tablet Take 1 tablet by mouth every 8 (eight) hours as needed for Pain. (Patient not taking: Reported on 6/30/2025) 10 each 0     No current facility-administered medications for this visit.   [2]   Social History  Tobacco Use    Smoking status: Some Days     Types: Cigarettes    Smokeless tobacco: Never

## 2025-07-01 ENCOUNTER — HOSPITAL ENCOUNTER (OUTPATIENT)
Dept: RADIOLOGY | Facility: HOSPITAL | Age: 40
Discharge: HOME | End: 2025-07-01
Attending: COLON & RECTAL SURGERY
Payer: MEDICAID

## 2025-07-01 DIAGNOSIS — R10.9 ABDOMINAL PAIN, UNSPECIFIED ABDOMINAL LOCATION: ICD-10-CM

## 2025-07-01 PROCEDURE — A9698 NON-RAD CONTRAST MATERIALNOC: HCPCS | Performed by: COLON & RECTAL SURGERY

## 2025-07-01 PROCEDURE — 25500020 PHARM REV CODE 255: Performed by: COLON & RECTAL SURGERY

## 2025-07-01 PROCEDURE — 74177 CT ABD & PELVIS W/CONTRAST: CPT | Mod: TC

## 2025-07-01 PROCEDURE — 74177 CT ABD & PELVIS W/CONTRAST: CPT | Mod: 26,,, | Performed by: RADIOLOGY

## 2025-07-01 RX ADMIN — IOHEXOL 100 ML: 350 INJECTION, SOLUTION INTRAVENOUS at 11:07

## 2025-07-01 RX ADMIN — IOHEXOL 1000 ML: 12 SOLUTION ORAL at 10:07

## (undated) DEVICE — MANIFOLD 4 PORT

## (undated) DEVICE — CLIP HEMO-LOK ML

## (undated) DEVICE — COVER LIGHT HANDLE 80/CA

## (undated) DEVICE — OBTURATOR BLADELESS 8MM XI CLR

## (undated) DEVICE — IRRIGATOR ENDOWRIST XI SUCTION

## (undated) DEVICE — SOL STRL WATER INJ 1000ML BG

## (undated) DEVICE — DRAPE THREE-QTR REINF 53X77IN

## (undated) DEVICE — SET PNEUMOCLEAR HEAT HUM SE HF

## (undated) DEVICE — GLOVE SIGNATURE ESSNTL LTX 7

## (undated) DEVICE — BAG TISSUE RETRIEVAL 5MM

## (undated) DEVICE — HEMOSTAT SURGICEL 4X8IN

## (undated) DEVICE — COVER TIP CURVED SCISSORS XI

## (undated) DEVICE — APPLICATOR CHLORAPREP ORN 26ML

## (undated) DEVICE — SYR 3CC LUER LOC

## (undated) DEVICE — SEAL CANN UNIVERSAL 5-12MM

## (undated) DEVICE — GLOVE SENSICARE PI GRN 7

## (undated) DEVICE — ELECTRODE REM PLYHSV RETURN 9

## (undated) DEVICE — KIT ANTIFOG W/SPONG & FLUID

## (undated) DEVICE — SUT MONOCRYL 4.0 PS2 CP496G

## (undated) DEVICE — HEADREST ROUND DISP FOAM 9IN

## (undated) DEVICE — NDL ECLIPSE SAFETY 23G 1.5IN

## (undated) DEVICE — TOWEL OR DISP STRL BLUE 4/PK

## (undated) DEVICE — DRAPE LAPSCP CHOLE 122X102X78

## (undated) DEVICE — PACK BASIC SETUP SC BR

## (undated) DEVICE — ADHESIVE DERMABOND ADVANCED

## (undated) DEVICE — SOL NORMAL USPCA 0.9%

## (undated) DEVICE — DRAPE COLUMN DAVINCI XI

## (undated) DEVICE — GOWN POLY REINF BRTH SLV XL

## (undated) DEVICE — DRAPE ARM DAVINCI XI